# Patient Record
Sex: MALE | Race: WHITE | NOT HISPANIC OR LATINO
[De-identification: names, ages, dates, MRNs, and addresses within clinical notes are randomized per-mention and may not be internally consistent; named-entity substitution may affect disease eponyms.]

---

## 2017-08-22 ENCOUNTER — APPOINTMENT (OUTPATIENT)
Dept: PULMONOLOGY | Facility: CLINIC | Age: 42
End: 2017-08-22
Payer: COMMERCIAL

## 2017-08-22 VITALS
TEMPERATURE: 98 F | WEIGHT: 220 LBS | BODY MASS INDEX: 30.8 KG/M2 | OXYGEN SATURATION: 98 % | HEART RATE: 82 BPM | DIASTOLIC BLOOD PRESSURE: 80 MMHG | SYSTOLIC BLOOD PRESSURE: 120 MMHG | RESPIRATION RATE: 16 BRPM | HEIGHT: 71 IN

## 2017-08-22 PROCEDURE — 99214 OFFICE O/P EST MOD 30 MIN: CPT | Mod: GC

## 2017-08-26 ENCOUNTER — OUTPATIENT (OUTPATIENT)
Dept: OUTPATIENT SERVICES | Facility: HOSPITAL | Age: 42
LOS: 1 days | End: 2017-08-26
Payer: COMMERCIAL

## 2017-08-26 ENCOUNTER — APPOINTMENT (OUTPATIENT)
Dept: CT IMAGING | Facility: IMAGING CENTER | Age: 42
End: 2017-08-26
Payer: COMMERCIAL

## 2017-08-26 DIAGNOSIS — J43.9 EMPHYSEMA, UNSPECIFIED: ICD-10-CM

## 2017-08-26 PROCEDURE — 71250 CT THORAX DX C-: CPT

## 2017-08-26 PROCEDURE — 71250 CT THORAX DX C-: CPT | Mod: 26

## 2017-09-11 ENCOUNTER — APPOINTMENT (OUTPATIENT)
Dept: PULMONOLOGY | Facility: CLINIC | Age: 42
End: 2017-09-11

## 2017-11-20 ENCOUNTER — APPOINTMENT (OUTPATIENT)
Dept: PULMONOLOGY | Facility: CLINIC | Age: 42
End: 2017-11-20

## 2018-05-18 ENCOUNTER — EMERGENCY (EMERGENCY)
Facility: HOSPITAL | Age: 43
LOS: 1 days | Discharge: HOME | End: 2018-05-18
Attending: EMERGENCY MEDICINE | Admitting: EMERGENCY MEDICINE
Payer: COMMERCIAL

## 2018-05-18 VITALS
SYSTOLIC BLOOD PRESSURE: 134 MMHG | RESPIRATION RATE: 16 BRPM | OXYGEN SATURATION: 96 % | HEART RATE: 86 BPM | TEMPERATURE: 98 F | DIASTOLIC BLOOD PRESSURE: 91 MMHG

## 2018-05-18 PROCEDURE — 73562 X-RAY EXAM OF KNEE 3: CPT | Mod: 26,LT

## 2018-05-18 PROCEDURE — 99284 EMERGENCY DEPT VISIT MOD MDM: CPT

## 2018-05-18 PROCEDURE — 71046 X-RAY EXAM CHEST 2 VIEWS: CPT | Mod: 26

## 2018-05-18 RX ORDER — OXYCODONE AND ACETAMINOPHEN 5; 325 MG/1; MG/1
1 TABLET ORAL ONCE
Qty: 0 | Refills: 0 | Status: DISCONTINUED | OUTPATIENT
Start: 2018-05-18 | End: 2018-05-18

## 2018-05-18 RX ORDER — TRAMADOL HYDROCHLORIDE 50 MG/1
1 TABLET ORAL
Qty: 9 | Refills: 0
Start: 2018-05-18 | End: 2018-05-20

## 2018-05-18 RX ADMIN — OXYCODONE AND ACETAMINOPHEN 1 TABLET(S): 5; 325 TABLET ORAL at 18:57
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
## 2018-05-18 NOTE — ED PROVIDER NOTE - EXTREMITY EXAM
moderate posterior thoracic rib pain no step off or crepitus moderate posterior thoracic rib ttp no step off or crepitus

## 2018-05-18 NOTE — ED PROVIDER NOTE - PROGRESS NOTE DETAILS
aixa garcia: no emergent findings on chest xray. pt to follow up with PMD , return precautions given, ready for dc. aixa garcia: no emergent findings on chest xray. pt to follow up with PMD , return precautions given, ready for dc. pt has incentive spirometer encouraged use ED Attending Dr. Winkler: pt requesting stronger pain medication, asking for dilaudid by name previously; reviewed [place]  and he is prescribed Suboxone and alprazolam regularly by Dr. Sukhi Forrester; has previously had 1 Rx for tramadol in the past--will give short supply of tramadol and outpatient follow up

## 2018-05-18 NOTE — ED PROVIDER NOTE - PLAN OF CARE
Rest,  Advance activity as tolerated.  Continue all previously prescribed medications as directed.  Follow up with your primary care physician in 48-72 hours- bring copies of your results.  Return to the Emergency Department for worsening or persistent symptoms OR ANY NEW OR CONCERNING SYMPTOMS. Rest, drink plenty of fluids.  Advance activity as tolerated.  Continue all previously prescribed medications as directed. Take Tramadol as directed- DO NOT drink/drive/operate machinery while on this medication, it can make you drowsy. Use your incentive spirometer as directed.  Follow up with your primary care physician in 48-72 hours- bring copies of your results.  Return to the Emergency Department for worsening or persistent symptoms OR ANY NEW OR CONCERNING SYMPTOMS.

## 2019-01-28 ENCOUNTER — APPOINTMENT (OUTPATIENT)
Dept: PULMONOLOGY | Facility: CLINIC | Age: 44
End: 2019-01-28
Payer: COMMERCIAL

## 2019-01-28 VITALS
RESPIRATION RATE: 16 BRPM | HEART RATE: 68 BPM | DIASTOLIC BLOOD PRESSURE: 79 MMHG | WEIGHT: 238 LBS | SYSTOLIC BLOOD PRESSURE: 132 MMHG | OXYGEN SATURATION: 98 % | BODY MASS INDEX: 33.32 KG/M2 | TEMPERATURE: 97.8 F | HEIGHT: 71 IN

## 2019-01-28 DIAGNOSIS — R04.2 HEMOPTYSIS: ICD-10-CM

## 2019-01-28 PROCEDURE — 99214 OFFICE O/P EST MOD 30 MIN: CPT

## 2019-01-28 NOTE — REASON FOR VISIT
[Follow-Up] : a follow-up visit [COPD] : COPD [Shortness of Breath] : shortness of Breath [Asthma] : asthma [Spouse] : spouse [FreeTextEntry2] : hemoptysis

## 2019-01-28 NOTE — HISTORY OF PRESENT ILLNESS
[FreeTextEntry1] : 42yo male patient former smoker with bullous emphysema, s/p b/l bullectomy in 2015, presenting today with complaint of hemoptysis.  Patient had similar complaint when last seen August 2017.  Patient reports that he had cold-like symptoms three weeks ago, including fits of coughing.  He states that he has had episodes of coughing blood, sometimes bright, red, and brown with clots.  His wife, who is also present, states that the cough is mostly mucous that is blood-tinged.  Today, the episodes seem to have lessened.  Patient does note, however, right sided pleuritic pain.  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  His previous lung functions have revealed an asthma-component, for which he has been maintained on Symbicort and Ventolin.  He has nebulized albuterol, which he uses sparingly.\par \par Chest CT 8/2017 IMPRESSION: \par Extensive emphysema. \par Small nonspecific region of atelectasis of the posterior basilar right lower \par lobe, associated with mild pleural thickening, is increased in prominence. \par Follow-up chest CT can be obtained in 6 months to evaluate for stability of \par this finding.\par

## 2019-01-28 NOTE — PHYSICAL EXAM
[General Appearance - In No Acute Distress] : no acute distress [Normal Conjunctiva] : the conjunctiva exhibited no abnormalities [Normal Oropharynx] : normal oropharynx [Neck Appearance] : the appearance of the neck was normal [Heart Rate And Rhythm] : heart rate and rhythm were normal [Heart Sounds] : normal S1 and S2 [] : no respiratory distress [Respiration, Rhythm And Depth] : normal respiratory rhythm and effort [Auscultation Breath Sounds / Voice Sounds] : lungs were clear to auscultation bilaterally [Abnormal Walk] : normal gait [Nail Clubbing] : no clubbing of the fingernails [No Focal Deficits] : no focal deficits [Affect] : the affect was normal [Cyanosis, Localized] : no localized cyanosis [Mood] : the mood was normal [FreeTextEntry2] : no edema [FreeTextEntry1] : multiple tattoos

## 2019-01-28 NOTE — REVIEW OF SYSTEMS
[Hemoptysis] : hemoptysis [Dyspnea] : dyspnea [Cough] : cough [Negative] : HEENT [Chest Tightness] : no chest tightness [Pleuritic Pain] : no pleuritic pain [Wheezing] : no wheezing [de-identified] : bipolar

## 2019-01-28 NOTE — DISCUSSION/SUMMARY
[FreeTextEntry1] : 42yo male patient former smoker with bullous emphysema, s/p b/l bullectomy in 2015, presenting today with complaint of hemoptysis.  Previous CT notable for bibasilar bullae.  Alpha-1 antitrypsin level to be checked today.  Uncertain as to the cause of his hemoptysis.  Repeat chest CT to be completed to evaluate for progression of disease.  Continue with current inhalers.\par Has reversible obstructive component

## 2019-01-29 ENCOUNTER — OTHER (OUTPATIENT)
Age: 44
End: 2019-01-29

## 2019-01-29 LAB — A1AT SERPL-MCNC: 135 MG/DL

## 2019-02-15 ENCOUNTER — FORM ENCOUNTER (OUTPATIENT)
Age: 44
End: 2019-02-15

## 2019-02-16 ENCOUNTER — OUTPATIENT (OUTPATIENT)
Dept: OUTPATIENT SERVICES | Facility: HOSPITAL | Age: 44
LOS: 1 days | End: 2019-02-16
Payer: COMMERCIAL

## 2019-02-16 ENCOUNTER — APPOINTMENT (OUTPATIENT)
Dept: CT IMAGING | Facility: IMAGING CENTER | Age: 44
End: 2019-02-16
Payer: COMMERCIAL

## 2019-02-16 DIAGNOSIS — Z00.8 ENCOUNTER FOR OTHER GENERAL EXAMINATION: ICD-10-CM

## 2019-02-16 PROCEDURE — 71250 CT THORAX DX C-: CPT

## 2019-02-16 PROCEDURE — 71250 CT THORAX DX C-: CPT | Mod: 26

## 2020-09-30 ENCOUNTER — INPATIENT (INPATIENT)
Facility: HOSPITAL | Age: 45
LOS: 0 days | Discharge: HOME | End: 2020-10-01
Attending: HOSPITALIST | Admitting: HOSPITALIST
Payer: COMMERCIAL

## 2020-09-30 VITALS
OXYGEN SATURATION: 95 % | DIASTOLIC BLOOD PRESSURE: 94 MMHG | RESPIRATION RATE: 20 BRPM | TEMPERATURE: 98 F | HEIGHT: 71 IN | HEART RATE: 77 BPM | SYSTOLIC BLOOD PRESSURE: 122 MMHG

## 2020-09-30 DIAGNOSIS — J44.1 CHRONIC OBSTRUCTIVE PULMONARY DISEASE WITH (ACUTE) EXACERBATION: ICD-10-CM

## 2020-09-30 LAB
ALBUMIN SERPL ELPH-MCNC: 4.4 G/DL — SIGNIFICANT CHANGE UP (ref 3.3–5)
ALP SERPL-CCNC: 64 U/L — SIGNIFICANT CHANGE UP (ref 40–120)
ALT FLD-CCNC: 39 U/L — SIGNIFICANT CHANGE UP (ref 4–41)
ANION GAP SERPL CALC-SCNC: 14 MMO/L — SIGNIFICANT CHANGE UP (ref 7–14)
APTT BLD: 28.5 SEC — SIGNIFICANT CHANGE UP (ref 27–36.3)
AST SERPL-CCNC: 18 U/L — SIGNIFICANT CHANGE UP (ref 4–40)
BASE EXCESS BLDV CALC-SCNC: 7 MMOL/L — SIGNIFICANT CHANGE UP
BASOPHILS # BLD AUTO: 0.11 K/UL — SIGNIFICANT CHANGE UP (ref 0–0.2)
BASOPHILS NFR BLD AUTO: 0.9 % — SIGNIFICANT CHANGE UP (ref 0–2)
BILIRUB SERPL-MCNC: < 0.2 MG/DL — LOW (ref 0.2–1.2)
BLOOD GAS VENOUS - CREATININE: 1.03 MG/DL — SIGNIFICANT CHANGE UP (ref 0.5–1.3)
BLOOD GAS VENOUS - FIO2: 21 — SIGNIFICANT CHANGE UP
BUN SERPL-MCNC: 16 MG/DL — SIGNIFICANT CHANGE UP (ref 7–23)
CALCIUM SERPL-MCNC: 10.1 MG/DL — SIGNIFICANT CHANGE UP (ref 8.4–10.5)
CHLORIDE BLDV-SCNC: 100 MMOL/L — SIGNIFICANT CHANGE UP (ref 96–108)
CHLORIDE SERPL-SCNC: 99 MMOL/L — SIGNIFICANT CHANGE UP (ref 98–107)
CO2 SERPL-SCNC: 29 MMOL/L — SIGNIFICANT CHANGE UP (ref 22–31)
CREAT SERPL-MCNC: 0.88 MG/DL — SIGNIFICANT CHANGE UP (ref 0.5–1.3)
EOSINOPHIL # BLD AUTO: 0.12 K/UL — SIGNIFICANT CHANGE UP (ref 0–0.5)
EOSINOPHIL NFR BLD AUTO: 1 % — SIGNIFICANT CHANGE UP (ref 0–6)
GAS PNL BLDV: 143 MMOL/L — SIGNIFICANT CHANGE UP (ref 136–146)
GLUCOSE BLDV-MCNC: 101 MG/DL — HIGH (ref 70–99)
GLUCOSE SERPL-MCNC: 100 MG/DL — HIGH (ref 70–99)
HCO3 BLDV-SCNC: 28 MMOL/L — HIGH (ref 20–27)
HCT VFR BLD CALC: 46.1 % — SIGNIFICANT CHANGE UP (ref 39–50)
HCT VFR BLDV CALC: 50.2 % — SIGNIFICANT CHANGE UP (ref 39–51)
HGB BLD-MCNC: 15.6 G/DL — SIGNIFICANT CHANGE UP (ref 13–17)
HGB BLDV-MCNC: 16.4 G/DL — SIGNIFICANT CHANGE UP (ref 13–17)
IMM GRANULOCYTES NFR BLD AUTO: 1.8 % — HIGH (ref 0–1.5)
INR BLD: 0.95 — SIGNIFICANT CHANGE UP (ref 0.88–1.16)
LACTATE BLDV-MCNC: 1.4 MMOL/L — SIGNIFICANT CHANGE UP (ref 0.5–2)
LYMPHOCYTES # BLD AUTO: 27.2 % — SIGNIFICANT CHANGE UP (ref 13–44)
LYMPHOCYTES # BLD AUTO: 3.24 K/UL — SIGNIFICANT CHANGE UP (ref 1–3.3)
MCHC RBC-ENTMCNC: 31 PG — SIGNIFICANT CHANGE UP (ref 27–34)
MCHC RBC-ENTMCNC: 33.8 % — SIGNIFICANT CHANGE UP (ref 32–36)
MCV RBC AUTO: 91.7 FL — SIGNIFICANT CHANGE UP (ref 80–100)
MONOCYTES # BLD AUTO: 0.96 K/UL — HIGH (ref 0–0.9)
MONOCYTES NFR BLD AUTO: 8.1 % — SIGNIFICANT CHANGE UP (ref 2–14)
NEUTROPHILS # BLD AUTO: 7.26 K/UL — SIGNIFICANT CHANGE UP (ref 1.8–7.4)
NEUTROPHILS NFR BLD AUTO: 61 % — SIGNIFICANT CHANGE UP (ref 43–77)
NRBC # FLD: 0 K/UL — SIGNIFICANT CHANGE UP (ref 0–0)
NT-PROBNP SERPL-SCNC: 16.95 PG/ML — SIGNIFICANT CHANGE UP
PCO2 BLDV: 56 MMHG — HIGH (ref 41–51)
PH BLDV: 7.38 PH — SIGNIFICANT CHANGE UP (ref 7.32–7.43)
PLATELET # BLD AUTO: 217 K/UL — SIGNIFICANT CHANGE UP (ref 150–400)
PMV BLD: 9.9 FL — SIGNIFICANT CHANGE UP (ref 7–13)
PO2 BLDV: 35 MMHG — SIGNIFICANT CHANGE UP (ref 35–40)
POTASSIUM BLDV-SCNC: 4.2 MMOL/L — SIGNIFICANT CHANGE UP (ref 3.4–4.5)
POTASSIUM SERPL-MCNC: 4.4 MMOL/L — SIGNIFICANT CHANGE UP (ref 3.5–5.3)
POTASSIUM SERPL-SCNC: 4.4 MMOL/L — SIGNIFICANT CHANGE UP (ref 3.5–5.3)
PROT SERPL-MCNC: 7 G/DL — SIGNIFICANT CHANGE UP (ref 6–8.3)
PROTHROM AB SERPL-ACNC: 10.9 SEC — SIGNIFICANT CHANGE UP (ref 10.6–13.6)
RBC # BLD: 5.03 M/UL — SIGNIFICANT CHANGE UP (ref 4.2–5.8)
RBC # FLD: 12.7 % — SIGNIFICANT CHANGE UP (ref 10.3–14.5)
SAO2 % BLDV: 63.9 % — SIGNIFICANT CHANGE UP (ref 60–85)
SODIUM SERPL-SCNC: 142 MMOL/L — SIGNIFICANT CHANGE UP (ref 135–145)
TROPONIN T, HIGH SENSITIVITY: 6 NG/L — SIGNIFICANT CHANGE UP (ref ?–14)
TROPONIN T, HIGH SENSITIVITY: 7 NG/L — SIGNIFICANT CHANGE UP (ref ?–14)
WBC # BLD: 11.91 K/UL — HIGH (ref 3.8–10.5)
WBC # FLD AUTO: 11.91 K/UL — HIGH (ref 3.8–10.5)

## 2020-09-30 PROCEDURE — 99285 EMERGENCY DEPT VISIT HI MDM: CPT

## 2020-09-30 PROCEDURE — 71275 CT ANGIOGRAPHY CHEST: CPT | Mod: 26

## 2020-09-30 RX ORDER — ALBUTEROL 90 UG/1
8 AEROSOL, METERED ORAL ONCE
Refills: 0 | Status: COMPLETED | OUTPATIENT
Start: 2020-09-30 | End: 2020-09-30

## 2020-09-30 RX ORDER — ASPIRIN/CALCIUM CARB/MAGNESIUM 324 MG
81 TABLET ORAL DAILY
Refills: 0 | Status: DISCONTINUED | OUTPATIENT
Start: 2020-09-30 | End: 2020-10-01

## 2020-09-30 RX ADMIN — Medication 81 MILLIGRAM(S): at 21:23

## 2020-09-30 RX ADMIN — Medication 125 MILLIGRAM(S): at 19:01

## 2020-09-30 RX ADMIN — ALBUTEROL 8 PUFF(S): 90 AEROSOL, METERED ORAL at 18:55

## 2020-09-30 NOTE — ED PROVIDER NOTE - CARE PLAN
Principal Discharge DX:	COPD exacerbation   Principal Discharge DX:	COPD exacerbation  Secondary Diagnosis:	Chest pain in adult

## 2020-09-30 NOTE — ED PROVIDER NOTE - CLINICAL SUMMARY MEDICAL DECISION MAKING FREE TEXT BOX
pt with cp and sob, h/o ptx  cbc, cmp, trop, coags, ekg, cta chest; reassess 41 y/o M with PMH of COPD from asbestos exposure w/ pneumothorax in past, psych disorder p/w 1 week of chest pain  and sob. Pt tried home albuterol , azithro and prednisone but symptoms persisted. + chest pain, + SOB. concern for possible partially treated COpd exacerbation vs PE vs CAD will obtain cbc ,cmp, vbg, pt/inr, ct angio chest, trop, probnp, albuterol, steroids, levaquin, admit

## 2020-09-30 NOTE — ED PROVIDER NOTE - PROGRESS NOTE DETAILS
Dr. Kirby: I have personally seen and examined this patient at the bedside. I have fully participated in the care of this patient. I have reviewed all pertinent clinical information, including history, physical exam, plan and the PA's note and agree except as noted. HPI above as by me. PE above as by me. DDX PLAN discussed with hospitalist patient was admitted to medicine for further evaluation and treatment/management will give asa

## 2020-09-30 NOTE — ED PROVIDER NOTE - ATTENDING CONTRIBUTION TO CARE
41 y/o M with PMH of COPD from asbestos exposure w/ pneumothorax in past, psych disorder p/w 1 week of chest pain . Pain is pleuritic located on the left side of his chest similar to when he had a ptx. He Denies cough, fever, nausea, vomting, diarrhea. LE edema. Pt states the pain is 5/10 worse with inspiration. He denies sudden onset , states it was sudden onset . He took some steroids and azithromycin which did not fully resolve his symptoms. He states he still has pain with deep inspiration and with exertion. He denies fever, chills. abdominal pain. He went to urgent care and had a xray and was told he may need a ct. Ddenies LE swelling. Pulm Dr. handley  denies fever, chills, +chest pain, +SOB, abdominal pain, diarrhea, dysuria, syncope, bleeding, new rash,weakness, numbness, blurred vision    ROS  otherwise negative as per HPI  Gen: Awake, Alert, WD, WN, NAD  Head:  NC/AT  Eyes:  PERRL, EOMI, Conjunctiva pink, lids normal, no scleral icterus  ENT: moist mucus membranes  Neck: supple, nontender, no meningismus, no JVD, trachea midline  Cardiac/CV:  S1 S2, tachycardic, no M/G/R  Respiratory/Pulm:  prolonged respiratory phase, diminished in bases   Gastrointestinal/Abdomen:  Soft, nontender, nondistended, +BS, no rebound/guarding  Back:  no CVAT, no MLT  Ext:  warm, well perfused, moving all extremities spontaneously, no peripheral edema, distal pulses intact  Skin: intact, no rash  Neuro:  AAOx3, sensation intact, motor 5/5 x 4 extremities, normal gait, speech clear  MDM as above

## 2020-09-30 NOTE — ED ADULT NURSE NOTE - OBJECTIVE STATEMENT
pt received spot 8. pt A+Ox3 , hx of COPD and seizures, c/o back pain, describes as  "sharp lung pain" and sob since wednesday. states he started himself on oral steroids and antibiotics with no improvement. denies cp/fever/chills. respirations even and unlabored. labs sent. IVSL in place. placed on > MD mason on going. awaiting further orders. will monitor.

## 2020-09-30 NOTE — ED PROVIDER NOTE - OBJECTIVE STATEMENT
44 y/o M PMH COPD with h/o bullectomy 2/2 asbestos exposure c/b spontaneous pneumothorax c/o lt sided CP and SOB x 1 week. Pain is worse with deep insirpation, not effected by movement or exertion. Pt went to urgent care last week and had cxr which was inconclusive noting an abnormality to LLL and recommending CT for further eval. Denies fever, chills, cough, congestion, abdominal pain, N/V, recent trauma.

## 2020-09-30 NOTE — ED ADULT NURSE REASSESSMENT NOTE - NS ED NURSE REASSESS COMMENT FT1
Received report from STEFAN Xiao. Patient A&OX4. Patient vital signs as noted. patient in no acute distress. Patient at 95% room air. Patient to be admitted at this time. Will continue to monitor.

## 2020-09-30 NOTE — ED ADULT TRIAGE NOTE - CHIEF COMPLAINT QUOTE
Patient c/o chest pain, lung discomfort feels like "lungs are being punched", pressure on chest.  History COPD, pneumothorax in 2015. Taking steroids/inhaler since Wednesday without improvement.  Had outpatient XR and was sent to ED for abnormal findings (has copy report).  Respirations nonlabored, O2sat 95% in triage, audible wheezing. Denies fevers/chills/cough.

## 2020-10-01 ENCOUNTER — TRANSCRIPTION ENCOUNTER (OUTPATIENT)
Age: 45
End: 2020-10-01

## 2020-10-01 VITALS
HEART RATE: 83 BPM | RESPIRATION RATE: 18 BRPM | DIASTOLIC BLOOD PRESSURE: 76 MMHG | SYSTOLIC BLOOD PRESSURE: 134 MMHG | OXYGEN SATURATION: 94 % | TEMPERATURE: 98 F

## 2020-10-01 DIAGNOSIS — R07.9 CHEST PAIN, UNSPECIFIED: ICD-10-CM

## 2020-10-01 DIAGNOSIS — Z98.890 OTHER SPECIFIED POSTPROCEDURAL STATES: Chronic | ICD-10-CM

## 2020-10-01 DIAGNOSIS — F31.9 BIPOLAR DISORDER, UNSPECIFIED: ICD-10-CM

## 2020-10-01 DIAGNOSIS — J44.1 CHRONIC OBSTRUCTIVE PULMONARY DISEASE WITH (ACUTE) EXACERBATION: ICD-10-CM

## 2020-10-01 DIAGNOSIS — Z02.9 ENCOUNTER FOR ADMINISTRATIVE EXAMINATIONS, UNSPECIFIED: ICD-10-CM

## 2020-10-01 DIAGNOSIS — Z29.9 ENCOUNTER FOR PROPHYLACTIC MEASURES, UNSPECIFIED: ICD-10-CM

## 2020-10-01 DIAGNOSIS — R93.89 ABNORMAL FINDINGS ON DIAGNOSTIC IMAGING OF OTHER SPECIFIED BODY STRUCTURES: ICD-10-CM

## 2020-10-01 DIAGNOSIS — J44.9 CHRONIC OBSTRUCTIVE PULMONARY DISEASE, UNSPECIFIED: ICD-10-CM

## 2020-10-01 DIAGNOSIS — R73.03 PREDIABETES: ICD-10-CM

## 2020-10-01 LAB
ANION GAP SERPL CALC-SCNC: 18 MMO/L — HIGH (ref 7–14)
BUN SERPL-MCNC: 16 MG/DL — SIGNIFICANT CHANGE UP (ref 7–23)
CALCIUM SERPL-MCNC: 9.9 MG/DL — SIGNIFICANT CHANGE UP (ref 8.4–10.5)
CHLORIDE SERPL-SCNC: 95 MMOL/L — LOW (ref 98–107)
CHOLEST SERPL-MCNC: 202 MG/DL — HIGH (ref 120–199)
CO2 SERPL-SCNC: 26 MMOL/L — SIGNIFICANT CHANGE UP (ref 22–31)
CREAT SERPL-MCNC: 0.68 MG/DL — SIGNIFICANT CHANGE UP (ref 0.5–1.3)
GLUCOSE SERPL-MCNC: 191 MG/DL — HIGH (ref 70–99)
HBA1C BLD-MCNC: 6.2 % — HIGH (ref 4–5.6)
HCT VFR BLD CALC: 45.3 % — SIGNIFICANT CHANGE UP (ref 39–50)
HDLC SERPL-MCNC: 82 MG/DL — HIGH (ref 35–55)
HGB BLD-MCNC: 15.2 G/DL — SIGNIFICANT CHANGE UP (ref 13–17)
LIPID PNL WITH DIRECT LDL SERPL: 114 MG/DL — SIGNIFICANT CHANGE UP
MAGNESIUM SERPL-MCNC: 1.8 MG/DL — SIGNIFICANT CHANGE UP (ref 1.6–2.6)
MCHC RBC-ENTMCNC: 30.4 PG — SIGNIFICANT CHANGE UP (ref 27–34)
MCHC RBC-ENTMCNC: 33.6 % — SIGNIFICANT CHANGE UP (ref 32–36)
MCV RBC AUTO: 90.6 FL — SIGNIFICANT CHANGE UP (ref 80–100)
NRBC # FLD: 0 K/UL — SIGNIFICANT CHANGE UP (ref 0–0)
PHOSPHATE SERPL-MCNC: 4 MG/DL — SIGNIFICANT CHANGE UP (ref 2.5–4.5)
PLATELET # BLD AUTO: 217 K/UL — SIGNIFICANT CHANGE UP (ref 150–400)
PMV BLD: 10 FL — SIGNIFICANT CHANGE UP (ref 7–13)
POTASSIUM SERPL-MCNC: 4.3 MMOL/L — SIGNIFICANT CHANGE UP (ref 3.5–5.3)
POTASSIUM SERPL-SCNC: 4.3 MMOL/L — SIGNIFICANT CHANGE UP (ref 3.5–5.3)
RBC # BLD: 5 M/UL — SIGNIFICANT CHANGE UP (ref 4.2–5.8)
RBC # FLD: 12.5 % — SIGNIFICANT CHANGE UP (ref 10.3–14.5)
SARS-COV-2 RNA SPEC QL NAA+PROBE: SIGNIFICANT CHANGE UP
SODIUM SERPL-SCNC: 139 MMOL/L — SIGNIFICANT CHANGE UP (ref 135–145)
TRIGL SERPL-MCNC: 74 MG/DL — SIGNIFICANT CHANGE UP (ref 10–149)
TSH SERPL-MCNC: 0.77 UIU/ML — SIGNIFICANT CHANGE UP (ref 0.27–4.2)
WBC # BLD: 10.09 K/UL — SIGNIFICANT CHANGE UP (ref 3.8–10.5)
WBC # FLD AUTO: 10.09 K/UL — SIGNIFICANT CHANGE UP (ref 3.8–10.5)

## 2020-10-01 PROCEDURE — 99239 HOSP IP/OBS DSCHRG MGMT >30: CPT

## 2020-10-01 PROCEDURE — 99223 1ST HOSP IP/OBS HIGH 75: CPT

## 2020-10-01 RX ORDER — BUDESONIDE AND FORMOTEROL FUMARATE DIHYDRATE 160; 4.5 UG/1; UG/1
2 AEROSOL RESPIRATORY (INHALATION)
Refills: 0 | Status: DISCONTINUED | OUTPATIENT
Start: 2020-10-01 | End: 2020-10-01

## 2020-10-01 RX ORDER — IPRATROPIUM/ALBUTEROL SULFATE 18-103MCG
3 AEROSOL WITH ADAPTER (GRAM) INHALATION EVERY 6 HOURS
Refills: 0 | Status: DISCONTINUED | OUTPATIENT
Start: 2020-10-01 | End: 2020-10-01

## 2020-10-01 RX ORDER — FLUOXETINE HCL 10 MG
20 CAPSULE ORAL DAILY
Refills: 0 | Status: DISCONTINUED | OUTPATIENT
Start: 2020-10-01 | End: 2020-10-01

## 2020-10-01 RX ORDER — ACETAMINOPHEN 500 MG
650 TABLET ORAL EVERY 6 HOURS
Refills: 0 | Status: DISCONTINUED | OUTPATIENT
Start: 2020-10-01 | End: 2020-10-01

## 2020-10-01 RX ORDER — ASPIRIN/CALCIUM CARB/MAGNESIUM 324 MG
1 TABLET ORAL
Qty: 30 | Refills: 0
Start: 2020-10-01 | End: 2020-10-30

## 2020-10-01 RX ORDER — ENOXAPARIN SODIUM 100 MG/ML
40 INJECTION SUBCUTANEOUS DAILY
Refills: 0 | Status: DISCONTINUED | OUTPATIENT
Start: 2020-10-01 | End: 2020-10-01

## 2020-10-01 RX ORDER — DIVALPROEX SODIUM 500 MG/1
0 TABLET, DELAYED RELEASE ORAL
Qty: 0 | Refills: 0 | DISCHARGE

## 2020-10-01 RX ORDER — DIVALPROEX SODIUM 500 MG/1
1 TABLET, DELAYED RELEASE ORAL
Qty: 0 | Refills: 0 | DISCHARGE

## 2020-10-01 RX ORDER — DIVALPROEX SODIUM 500 MG/1
500 TABLET, DELAYED RELEASE ORAL
Refills: 0 | Status: DISCONTINUED | OUTPATIENT
Start: 2020-10-01 | End: 2020-10-01

## 2020-10-01 RX ORDER — SODIUM CHLORIDE 9 MG/ML
3 INJECTION INTRAMUSCULAR; INTRAVENOUS; SUBCUTANEOUS EVERY 8 HOURS
Refills: 0 | Status: DISCONTINUED | OUTPATIENT
Start: 2020-10-01 | End: 2020-10-01

## 2020-10-01 RX ORDER — MONTELUKAST 4 MG/1
10 TABLET, CHEWABLE ORAL DAILY
Refills: 0 | Status: DISCONTINUED | OUTPATIENT
Start: 2020-10-01 | End: 2020-10-01

## 2020-10-01 RX ADMIN — Medication 81 MILLIGRAM(S): at 11:37

## 2020-10-01 RX ADMIN — Medication 40 MILLIGRAM(S): at 06:20

## 2020-10-01 RX ADMIN — SODIUM CHLORIDE 3 MILLILITER(S): 9 INJECTION INTRAMUSCULAR; INTRAVENOUS; SUBCUTANEOUS at 06:21

## 2020-10-01 RX ADMIN — Medication 3 MILLILITER(S): at 04:20

## 2020-10-01 RX ADMIN — Medication 3 MILLILITER(S): at 09:25

## 2020-10-01 RX ADMIN — Medication 650 MILLIGRAM(S): at 03:11

## 2020-10-01 RX ADMIN — DIVALPROEX SODIUM 500 MILLIGRAM(S): 500 TABLET, DELAYED RELEASE ORAL at 06:17

## 2020-10-01 RX ADMIN — ENOXAPARIN SODIUM 40 MILLIGRAM(S): 100 INJECTION SUBCUTANEOUS at 11:37

## 2020-10-01 RX ADMIN — MONTELUKAST 10 MILLIGRAM(S): 4 TABLET, CHEWABLE ORAL at 11:37

## 2020-10-01 RX ADMIN — Medication 650 MILLIGRAM(S): at 03:45

## 2020-10-01 NOTE — H&P ADULT - PROBLEM SELECTOR PLAN 5
1.  Name of PCP: Angel Katz   2.  PCP Contacted on Admission: [ ] Y    [X] N    3.  PCP contacted at Discharge: [ ] Y    [ ] N    [ ] N/A  4.  Post-Discharge Appointment Date and Location:  5.  Summary of Handoff given to PCP: VTE with Lovenox 40 mg Sub cut daily.  Fall, Aspiration, safety precautions.

## 2020-10-01 NOTE — H&P ADULT - GASTROINTESTINAL DETAILS
no bruit/no guarding/no rigidity/no masses palpable/no rebound tenderness/soft/nontender/bowel sounds normal

## 2020-10-01 NOTE — PROGRESS NOTE ADULT - PROBLEM SELECTOR PLAN 6
VTE with Lovenox 40 mg Sub cut daily.  Fall, Aspiration, safety precautions. VTE with Lovenox 40 mg Sub cut daily.  Fall, Aspiration, safety precautions.  Dispo - DC Planning 31mins  FU PMD/Pulmonology as outpt

## 2020-10-01 NOTE — DISCHARGE NOTE PROVIDER - NSDCCPCAREPLAN_GEN_ALL_CORE_FT
PRINCIPAL DISCHARGE DIAGNOSIS  Diagnosis: COPD exacerbation  Assessment and Plan of Treatment: To slow down the progression of the disease by quitting smoking and avoiding triggers, such as air pollution.  Continue current medications as prescribed to prevent as shortness of breath, COPD exacerbation and to improve your overall health with regular activity and quality of life.  Follow up routine appointment, with Dr. Claire.  Call for an appointment.

## 2020-10-01 NOTE — DISCHARGE NOTE PROVIDER - NSDCMRMEDTOKEN_GEN_ALL_CORE_FT
aspirin 81 mg oral tablet, chewable: 1 tab(s) orally once a day  divalproex sodium 500 mg oral delayed release tablet: 1 tab(s) orally 2 times a day  FLUoxetine 20 mg oral capsule: 1 cap(s) orally once a day  IPRAT-ALBUT 0.5-3(2.5) MG/3 ML:   levoFLOXacin 500 mg oral tablet: 1 tab(s) orally every 24 hours  predniSONE 20 mg oral tablet: 2 tab(s) orally once a day  Singulair 10 mg oral tablet: 1 tab(s) orally once a day  Symbicort 160 mcg-4.5 mcg/inh inhalation aerosol: 2 puff(s) inhaled 2 times a day

## 2020-10-01 NOTE — DISCHARGE NOTE PROVIDER - PROVIDER TOKENS
PROVIDER:[TOKEN:[[id number]],ESTABLISHEDPATIENT:[T]] PROVIDER:[TOKEN:[[id number]],FOLLOWUP:[1 week],ESTABLISHEDPATIENT:[T]]

## 2020-10-01 NOTE — PROGRESS NOTE ADULT - PROBLEM SELECTOR PLAN 3
Atypical chest pain in nature likely secondary to COPD exacerbation   low suspicion for ACS  (Trop 7-->6, EKG nonischemic)   CTA read without PE   -continue to monitor CP and manage COPD, currently improved

## 2020-10-01 NOTE — H&P ADULT - NEGATIVE OPHTHALMOLOGIC SYMPTOMS
no lacrimation R/no discharge R/no blurred vision R/no blurred vision L/no discharge L/no photophobia/no pain L/no lacrimation L

## 2020-10-01 NOTE — H&P ADULT - NSHPLABSRESULTS_GEN_ALL_CORE
CTPA: No pulmonary embolus. No pneumothorax. Severe bolus disease and emphysema within the upper lungs, right greater than left.  EKG NSR @ 73b/ min , QT/ QTC= 384/ 423.  Trop 7> 6  ProBNP= 17                        15.6   11.91 )-----------( 217      ( 30 Sep 2020 18:35 )             46.1   09-30    142  |  99  |  16  ----------------------------<  100<H>  4.4   |  29  |  0.88    Ca    10.1      30 Sep 2020 18:35    TPro  7.0  /  Alb  4.4  /  TBili  < 0.2<L>  /  DBili  x   /  AST  18  /  ALT  39  /  AlkPhos  64  09-30 CTPA: No pulmonary embolus. No pneumothorax. Severe bolus disease and emphysema within the upper lungs, right greater than left.  EKG NSR @ 73b/ min , QT/ QTC= 384/ 423.  Trop 7> 6  ProBNP= 17  COVID PCR : Not detected.                         15.6   11.91 )-----------( 217      ( 30 Sep 2020 18:35 )             46.1   09-30    142  |  99  |  16  ----------------------------<  100<H>  4.4   |  29  |  0.88    Ca    10.1      30 Sep 2020 18:35    TPro  7.0  /  Alb  4.4  /  TBili  < 0.2<L>  /  DBili  x   /  AST  18  /  ALT  39  /  AlkPhos  64  09-30 I have personally reviewed this patient's labs below:                          15.6   11.91 )-----------( 217      ( 30 Sep 2020 18:35 )             46.1     09-30-20 @ 18:35    142  |  99  |  16             --------------------------< 100<H>     4.4  |  29  | 0.88    eGFR AA: 119  eGFR N-AA: 102    Calcium: 10.1  Phosphorus: --  Magnesium: --    AST: 18    ALT: 39  AlkPhos: 64  Protein: 7.0  Albumin: 4.4  TBili: < 0.2<L>  D-Bili: --    VBG - ( 30 Sep 2020 18:35 )  pH: 7.38  /  pCO2: 56    /  pO2: 35    / HCO3: 28    / Base Excess: 7.0   /  SvO2: 63.9  / Lactate: 1.4        Troponin 7--6    COVID negative    Probnp 16.95    I have personally reviewed this patient's EKG and my independent interpretation is NSR, no ischemic changes    I have personally reviewed this patient's CTA chest and my independent interpretation is extensive bullous changes bilaterally, right apex worse than left, no pleural effusions or consolidations. No central PE      CTA chest  IMPRESSION:    No pulmonary embolus. No pneumothorax.    Severe bolus disease and emphysema within the upper lungs, right greater than left

## 2020-10-01 NOTE — H&P ADULT - PROBLEM SELECTOR PLAN 6
1.  Name of PCP: Angel Katz   2.  PCP Contacted on Admission: [ ] Y    [X] N    3.  PCP contacted at Discharge: [ ] Y    [ ] N    [ ] N/A  4.  Post-Discharge Appointment Date and Location:  5.  Summary of Handoff given to PCP:

## 2020-10-01 NOTE — H&P ADULT - PROBLEM SELECTOR PROBLEM 4
Need for prophylactic measure Chronic obstructive pulmonary disease, unspecified COPD type Bipolar affective disorder, remission status unspecified

## 2020-10-01 NOTE — H&P ADULT - HISTORY OF PRESENT ILLNESS
45M with history of COPD, asbestos exposure, spontaneous pneumothorax, secondary to ruptured bullae s/p bilateral bullectomy and chest tubes, experiencing SOB, CONDE after a few feet, chills, generalized body aches, diaphoresis and HA, that began on 9/22. On 9/26, the pt was still with symptoms and began taking his wife's Zithromax 500mg po and Prednisone 40 mg po daily. His symptoms began to improve.  He then had a flu vaccine on 9/29 and the symptoms began to return. He went to [facility] and had a CXR done that showed: "Nodular density projecting over the L posterior 9th rib may be within the lung or the bone.  Recommend direct comparison with any prior outside studies to demonstrate stability of these findings. if these are not available CT of the thorax is suggested for further evaluation." The pt came to the Ed.    In the Ed, the pt was treated with Solumedrol 125 mg IV X 1 , Albuterol Inhaler 8 puffs and Levaquin 500mg IV., The pt says it provided relief of his symptoms.   CTPA: No pulmonary embolus. No pneumothorax. Severe bolus disease and emphysema within the upper lungs, right greater than left.  EKG NSR @ 73b/ min , QT/ QTC= 384/ 423.  Trop: 7>6.     Vitals T max 98.1, HR = 60b/ min, BP = 141/ 72, RR= 18b/ min, SPO2= 95% 2LNC

## 2020-10-01 NOTE — H&P ADULT - MUSCULOSKELETAL
details… detailed exam no calf tenderness/no joint warmth/normal strength/no joint erythema no joint warmth/no calf tenderness/ROM intact/normal strength/no joint swelling/no joint erythema

## 2020-10-01 NOTE — H&P ADULT - PROBLEM SELECTOR PROBLEM 3
Chronic obstructive pulmonary disease, unspecified COPD type Abnormal chest xray Chest pain, unspecified type

## 2020-10-01 NOTE — DISCHARGE NOTE PROVIDER - HOSPITAL COURSE
45M with history of COPD, asbestos exposure, spontaneous pneumothorax, secondary to ruptured bullae s/p bilateral bullectomy and chest tubes, experiencing SOB, CONDE after a few feet, chills, generalized body aches, diaphoresis and HA, that began on 9/22. On 9/26, the pt was still with symptoms and began taking his wife's Zithromax 500mg po and Prednisone 40 mg po daily. His symptoms began to improve.  He then had a flu vaccine on 9/29 and the symptoms began to return. He went to [facility] and had a CXR done that showed: "Nodular density projecting over the L posterior 9th rib may be within the lung or the bone.  Recommend direct comparison with any prior outside studies to demonstrate stability of these findings. if these are not available CT of the thorax is suggested for further evaluation." The pt came to the Ed.    In the Ed, the pt was treated with Solumedrol 125 mg IV X 1 , Albuterol Inhaler 8 puffs and Levaquin 500mg IV., The pt says it provided relief of his symptoms.   CTPA: No pulmonary embolus. No pneumothorax. Severe bolus disease and emphysema within the upper lungs, right greater than left.  EKG NSR @ 73b/ min , QT/ QTC= 384/ 423.  He was admitted to the floor and switched to PO Levaquin and Prednisone.  Patient had improvement of symptoms and is now clear for discharge home.

## 2020-10-01 NOTE — H&P ADULT - NEGATIVE NEUROLOGICAL SYMPTOMS
no weakness/no generalized seizures/no tremors/no facial palsy/no focal seizures/no syncope/no difficulty walking/no loss of consciousness/no transient paralysis/no paresthesias/no hemiparesis/no vertigo/no loss of sensation/no confusion

## 2020-10-01 NOTE — H&P ADULT - NSHPSOCIALHISTORY_GEN_ALL_CORE
.  Lives with the spouse.  Quit Nicotine 2014 after 12 pack years  Denies ETOH.  Denies Illicit / recreational drug use.   Works with elevators.  Colonoscopy: Denies.

## 2020-10-01 NOTE — PROGRESS NOTE ADULT - PROBLEM SELECTOR PLAN 1
Admitted with  SOB upon presentation to ED,   Now much improved after steroids, nebs and abx.   Placed on 2L NC  in ED, documented O2 sats  95% on RA  continue PO levaquin 500mg qd, prednisone 40mg qd, duonebs PRN  Continue home symbicort and Singulair.  Wean O2 as tolerated Admitted with  SOB upon presentation to ED,   Now much improved after steroids, nebs and abx.   Placed on 2L NC  in ED, documented O2 sats  95% on RA  O2 sats on RA- 98% at rest and 94-96% on ambulation, improving to 98%   continue PO levaquin 500mg qd, prednisone 40mg qd, duonebs PRN  Continue home symbicort and Singulair.

## 2020-10-01 NOTE — H&P ADULT - PROBLEM SELECTOR PLAN 2
Cardiac monitor.  Trop 7>6.  Give O2, ASA.  F/U Lipid panel, TSH, A1C. Cardiac monitor.  EKG without signs of ischemia.   Trop 7>6.  Give O2 via NC PRN, ASA.  F/U Lipid panel, TSH, A1C. Out pt CXR  9/29 revealing "Nodular density projecting over the L posterior 9th rib may be within the lung or the bone.  -2/16/19: CT Chest:  No suspicious pulmonary nodule. Emphysema. No aggressive osseous lesion.   -9/30/20: CTPA: No pulmonary embolus. No pneumothorax. Severe bolus disease and emphysema within the upper lungs, right greater than left. No aggressive osseous lesion.

## 2020-10-01 NOTE — H&P ADULT - PROBLEM SELECTOR PLAN 3
Continue Inhalers and Singulair. Done out pt 9/29 revealing "Nodular density projecting over the L posterior 9th rib may be within the lung or the bone.    -2/16/19: CT Chest:  No suspicious pulmonary nodule. Emphysema. No aggressive osseous lesion.   -9/30/20: CTPA: No pulmonary embolus. No pneumothorax. Severe bolus disease and emphysema within the upper lungs, right greater than left. No aggressive osseous lesion. Out pt CXR  9/29 revealing "Nodular density projecting over the L posterior 9th rib may be within the lung or the bone.  -2/16/19: CT Chest:  No suspicious pulmonary nodule. Emphysema. No aggressive osseous lesion.   -9/30/20: CTPA: No pulmonary embolus. No pneumothorax. Severe bolus disease and emphysema within the upper lungs, right greater than left. No aggressive osseous lesion. Atypical chest pain in nature, Trop 7-->6, EKG nonischemic. ACS ruled out. CTA read without PE. Chest pain likely related to COPD/bulla  -continue to monitor CP and manage COPD, currently improved

## 2020-10-01 NOTE — H&P ADULT - ASSESSMENT
45M with history of COPD, asbestos exposure, spontaneous pneumothorax admitted for COPD exacerbation.

## 2020-10-01 NOTE — PROGRESS NOTE ADULT - PROBLEM SELECTOR PLAN 2
Out pt CXR  9/29 revealing "Nodular density projecting over the L posterior 9th rib may be within the lung or the bone.  2/16/19: CT Chest:  No suspicious pulmonary nodule. Emphysema. No aggressive osseous lesion.   9/30/20: CTPA: No pulmonary embolus. No pneumothorax. Severe bullous disease and emphysema within the upper lungs, right greater than left. No aggressive osseous lesion. Out pt CXR  9/29 revealing "Nodular density projecting over the L posterior 9th rib may be within the lung or the bone.  2/16/19: CT Chest:  No suspicious pulmonary nodule. Emphysema. No aggressive osseous lesion.   9/30/20: CTPA: No pulmonary embolus. No pneumothorax. Severe bullous disease and emphysema within the upper lungs, right greater than left. No aggressive osseous lesion.  pt follows up with Dr Claire as outpt, Pt has an appointment on 10/13  with Dr Claire

## 2020-10-01 NOTE — DISCHARGE NOTE NURSING/CASE MANAGEMENT/SOCIAL WORK - PATIENT PORTAL LINK FT
You can access the FollowMyHealth Patient Portal offered by [facility] by registering at the following website: [email or web address]. By joining [employer]’s FollowMyHealth portal, you will also be able to view your health information using other applications (apps) compatible with our system.

## 2020-10-01 NOTE — H&P ADULT - PROBLEM SELECTOR PLAN 4
VTE with Lovenox 40 mg Sub cut daily.  Fall, Aspiration, safety precautions. Continue Inhalers and Singulair. Continue depakote

## 2020-10-01 NOTE — H&P ADULT - RS GEN PE MLT RESP DETAILS PC
no intercostal retractions/no rhonchi/clear to auscultation bilaterally/no rales/no wheezes/no chest wall tenderness/good air movement/no subcutaneous emphysema/airway patent/breath sounds equal/respirations non-labored

## 2020-10-01 NOTE — H&P ADULT - PROBLEM SELECTOR PLAN 1
Continue Levaquin 500mg IV, Prednisone 40 mg po daily, Duo Nebs Q6* PRN.  Incentive spirometry. Continue Levaquin 500mg IV, Prednisone 40 mg po daily, Duo Nebs Q6* PRN.  Incentive spirometry.  Continue Inhalers and Singulair. Initially with SOB upon presentation to ED, now much improved after steroids, nebs and abx. No change in cough, no wheezing on exam at this time. Placed on 2L NC for desat in ED.   -continue PO levaquin 500mg qd, prednisone 40mg qd, duonebs PRN  -continue home symbicort and Singulair.

## 2020-10-01 NOTE — PROGRESS NOTE ADULT - SUBJECTIVE AND OBJECTIVE BOX
PROGRESS NOTE:     Patient is a 45y old  Male who presents with a chief complaint of SOB X 1 week (01 Oct 2020 04:16)      SUBJECTIVE / OVERNIGHT EVENTS:    ADDITIONAL REVIEW OF SYSTEMS:    MEDICATIONS  (STANDING):  aspirin  chewable 81 milliGRAM(s) Oral daily  budesonide 160 MICROgram(s)/formoterol 4.5 MICROgram(s) Inhaler 2 Puff(s) Inhalation two times a day  diVALproex  milliGRAM(s) Oral two times a day  enoxaparin Injectable 40 milliGRAM(s) SubCutaneous daily  levoFLOXacin  Tablet 500 milliGRAM(s) Oral every 24 hours  montelukast 10 milliGRAM(s) Oral daily  predniSONE   Tablet 40 milliGRAM(s) Oral daily  sodium chloride 0.9% lock flush 3 milliLiter(s) IV Push every 8 hours    MEDICATIONS  (PRN):  acetaminophen   Tablet .. 650 milliGRAM(s) Oral every 6 hours PRN Temp greater or equal to 38C (100.4F), Mild Pain (1 - 3), Moderate Pain (4 - 6)  albuterol/ipratropium for Nebulization. 3 milliLiter(s) Nebulizer every 6 hours PRN Shortness of Breath and/or Wheezing      CAPILLARY BLOOD GLUCOSE        I&O's Summary      PHYSICAL EXAM:  Vital Signs Last 24 Hrs  T(C): 36.8 (01 Oct 2020 06:15), Max: 36.8 (01 Oct 2020 06:15)  T(F): 98.2 (01 Oct 2020 06:15), Max: 98.2 (01 Oct 2020 06:15)  HR: 69 (01 Oct 2020 06:15) (60 - 77)  BP: 123/82 (01 Oct 2020 06:15) (122/66 - 141/84)  BP(mean): --  RR: 19 (01 Oct 2020 06:15) (13 - 21)  SpO2: 96% (01 Oct 2020 06:15) (91% - 98%)    CONSTITUTIONAL: NAD, well-developed  RESPIRATORY: Normal respiratory effort; lungs are clear to auscultation bilaterally  CARDIOVASCULAR: Regular rate and rhythm, normal S1 and S2, no murmur/rub/gallop; No lower extremity edema; Peripheral pulses are 2+ bilaterally  ABDOMEN: Nontender to palpation, normoactive bowel sounds, no rebound/guarding; No hepatosplenomegaly  MUSCLOSKELETAL: no clubbing or cyanosis of digits; no joint swelling or tenderness to palpation  PSYCH: A+O to person, place, and time; affect appropriate  NEURO:  SKIN:    LABS:                        15.2   10.09 )-----------( 217      ( 01 Oct 2020 05:55 )             45.3     10-01    139  |  95<L>  |  16  ----------------------------<  191<H>  4.3   |  26  |  0.68    Ca    9.9      01 Oct 2020 05:55  Phos  4.0     10-01  Mg     1.8     10-01    TPro  7.0  /  Alb  4.4  /  TBili  < 0.2<L>  /  DBili  x   /  AST  18  /  ALT  39  /  AlkPhos  64  09-30    PT/INR - ( 30 Sep 2020 18:35 )   PT: 10.9 SEC;   INR: 0.95          PTT - ( 30 Sep 2020 18:35 )  PTT:28.5 SEC            RADIOLOGY & ADDITIONAL TESTS:  Results Reviewed:   Imaging Personally Reviewed:  Electrocardiogram Personally Reviewed:    COORDINATION OF CARE:  Care Discussed with Consultants/Other Providers [Y/N]:  Prior or Outpatient Records Reviewed [Y/N]:   Judith Miner  Hospitalist  Pager- [phone]    Patient is a 45y old  Male who presents with a chief complaint of SOB X 1 week (01 Oct 2020 04:16)      SUBJECTIVE / OVERNIGHT EVENTS: Pt seen and examined by me  Pt reports significant improvement in SOB, feels at baseline  Able to ambulated with SOB   Denies fever or chills     ADDITIONAL REVIEW OF SYSTEMS:    MEDICATIONS  (STANDING):  aspirin  chewable 81 milliGRAM(s) Oral daily  budesonide 160 MICROgram(s)/formoterol 4.5 MICROgram(s) Inhaler 2 Puff(s) Inhalation two times a day  diVALproex  milliGRAM(s) Oral two times a day  enoxaparin Injectable 40 milliGRAM(s) SubCutaneous daily  levoFLOXacin  Tablet 500 milliGRAM(s) Oral every 24 hours  montelukast 10 milliGRAM(s) Oral daily  predniSONE   Tablet 40 milliGRAM(s) Oral daily  sodium chloride 0.9% lock flush 3 milliLiter(s) IV Push every 8 hours    MEDICATIONS  (PRN):  acetaminophen   Tablet .. 650 milliGRAM(s) Oral every 6 hours PRN Temp greater or equal to 38C (100.4F), Mild Pain (1 - 3), Moderate Pain (4 - 6)  albuterol/ipratropium for Nebulization. 3 milliLiter(s) Nebulizer every 6 hours PRN Shortness of Breath and/or Wheezing      CAPILLARY BLOOD GLUCOSE        I&O's Summary      PHYSICAL EXAM:  Vital Signs Last 24 Hrs  T(C): 36.8 (01 Oct 2020 06:15), Max: 36.8 (01 Oct 2020 06:15)  T(F): 98.2 (01 Oct 2020 06:15), Max: 98.2 (01 Oct 2020 06:15)  HR: 69 (01 Oct 2020 06:15) (60 - 77)  BP: 123/82 (01 Oct 2020 06:15) (122/66 - 141/84)  BP(mean): --  RR: 19 (01 Oct 2020 06:15) (13 - 21)  SpO2: 96% (01 Oct 2020 06:15) (91% - 98%)    CONSTITUTIONAL: NAD, well-developed  RESPIRATORY: Normal respiratory effort; lungs are clear to auscultation bilaterally  CARDIOVASCULAR: Regular rate and rhythm, normal S1 and S2, no murmur/rub/gallop; No lower extremity edema; Peripheral pulses are 2+ bilaterally  ABDOMEN: Nontender to palpation, normoactive bowel sounds, no rebound/guarding; No hepatosplenomegaly  MUSCLOSKELETAL: no clubbing or cyanosis of digits; no joint swelling or tenderness to palpation  PSYCH: A+O to person, place, and time; affect appropriate  NEURO:No focal deficits  SKIN: Tattoos present     LABS:                        15.2   10.09 )-----------( 217      ( 01 Oct 2020 05:55 )             45.3     10-01    139  |  95<L>  |  16  ----------------------------<  191<H>  4.3   |  26  |  0.68    Ca    9.9      01 Oct 2020 05:55  Phos  4.0     10-01  Mg     1.8     10-01    TPro  7.0  /  Alb  4.4  /  TBili  < 0.2<L>  /  DBili  x   /  AST  18  /  ALT  39  /  AlkPhos  64  09-30    PT/INR - ( 30 Sep 2020 18:35 )   PT: 10.9 SEC;   INR: 0.95          PTT - ( 30 Sep 2020 18:35 )  PTT:28.5 SEC            RADIOLOGY & ADDITIONAL TESTS:  Results Reviewed:   Imaging Personally Reviewed:  Electrocardiogram Personally Reviewed:    COORDINATION OF CARE:  Care Discussed with Consultants/Other Providers [Y/N]:  Prior or Outpatient Records Reviewed [Y/N]:

## 2020-10-01 NOTE — DISCHARGE NOTE PROVIDER - CARE PROVIDER_API CALL
Angel Claire  MEDICINE - PULMONARY MEDICINE  [address] [place] [address], [place] [address]  Phone: [phone]  Fax: [phone]  Established Patient  Follow Up Time:    Angel Claire  MEDICINE - PULMONARY MEDICINE  [address] [place] [address], [place] [address]  Phone: [phone]  Fax: [phone]  Established Patient  Follow Up Time: 1 week

## 2020-10-01 NOTE — H&P ADULT - ATTENDING COMMENTS
I have personally seen, examined, and participated in the care of this patient.  I have reviewed all pertinent clinical information, including history, physical exam, plan, and the PA's note and agree except as noted.    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 Chest pain is left sided, comes and goes, worsened with deep inspiration,  5/10, nonradiating. Had visited [facility] and had X ray with abnormality and told he would likely need a CT. Took azithromycin (wife’s medication) and steroids (his own prescribed) on his own at home initially with improvement but then SOB returned. SOB comes and goes and not necessarily made worse with exertion but at time significantly limits him the last few days. No change in cough or phlegm. In ED CTA without PTX or PE.    #COPD exacerbation  SOB, CO2 retention and subjectively improved with nebs/steroids. Though no cough or phlegm, presentation likely c/w COPD exacerbation. CP likely related to bullous disease and COPD. Continue with levaquin 500mg qd PO, prednisone 40mg qd, duonebs PRN. SOB is much improved and currently no wheezing on exam. Would benefit with pulm f/u as outpatient - follows with Dr. Claire and has appt Tuesday. Continue with home symbicort and singulair    #Chest pain  ACS ruled out trops 7-->6, EKG nonischemic. CTA without PE or PTX or consolidation. Atypical chest pain in nature and likely related to bullous disease/COPD    #Bipolar disorder?  Continue with depakote

## 2020-10-06 ENCOUNTER — APPOINTMENT (OUTPATIENT)
Dept: PULMONOLOGY | Facility: CLINIC | Age: 45
End: 2020-10-06
Payer: COMMERCIAL

## 2020-10-06 VITALS
BODY MASS INDEX: 37.8 KG/M2 | TEMPERATURE: 98.6 F | SYSTOLIC BLOOD PRESSURE: 127 MMHG | HEART RATE: 87 BPM | RESPIRATION RATE: 16 BRPM | DIASTOLIC BLOOD PRESSURE: 86 MMHG | WEIGHT: 270 LBS | HEIGHT: 71 IN

## 2020-10-06 DIAGNOSIS — R06.00 DYSPNEA, UNSPECIFIED: ICD-10-CM

## 2020-10-06 PROCEDURE — 99214 OFFICE O/P EST MOD 30 MIN: CPT

## 2020-10-06 NOTE — REASON FOR VISIT
[Follow-Up] : a follow-up visit [Emphysema] : emphysema [Abnormal CXR/ Chest CT] : an abnormal CXR/ chest CT [Shortness of Breath] : shortness of breath [Spouse] : spouse

## 2020-10-06 NOTE — REVIEW OF SYSTEMS
[Fatigue] : fatigue [Chills] : chills [Cough] : cough [Dyspnea] : dyspnea [SOB on Exertion] : sob on exertion [Negative] : Musculoskeletal

## 2020-10-06 NOTE — ASSESSMENT
[FreeTextEntry1] : 45 year old male with pmh former smoker with bullous emphysema s/p B/L bullectomy in 2015 presents for follow up. CT scan is consistent with severe bolus disease and emphysema which was not appreciated on chest xray. \par I also counseled both the patient and his wife regarding symptoms of pneumothorax reason touch with me.

## 2020-10-06 NOTE — HISTORY OF PRESENT ILLNESS
[TextBox_4] : 45 year old male with pmh former smoker with bullous emphysema s/p B/L bullectomy in 2015 presents for follow up. 2 weeks ago pt developed body aches and pains in his lungs.  He felt very short of breath and decided to get COVID tested which was negative.  He also self started Zpack and prednisone because he was concerned the shortness of breath would become worse.  When he still felt unwell, he went to urgent care and had a chest xray which showed a nodular density over the left posterior 9th rib within the lung or bone.  He then went for a follow up CT scan which showed severe bolus disease and emphysema within the upper lungs, right greater than left \par Pt is currently feeling better overall but wanted further evaluation regarding the nodular density. \par He has been taking Symbicort and Ventolin as needed and denies having any significant respiratory issues prior to this episode.

## 2020-10-06 NOTE — PHYSICAL EXAM
[No Acute Distress] : no acute distress [Normal Appearance] : normal appearance [Normal Rate/Rhythm] : normal rate/rhythm [Normal S1, S2] : normal s1, s2 [No Murmurs] : no murmurs [No Resp Distress] : no resp distress [Clear to Auscultation Bilaterally] : clear to auscultation bilaterally [No Abnormalities] : no abnormalities [Normal Gait] : normal gait [No Clubbing] : no clubbing [No Cyanosis] : no cyanosis [No Edema] : no edema [No Focal Deficits] : no focal deficits [Oriented x3] : oriented x3 [Normal Affect] : normal affect [TextBox_125] : star

## 2021-07-21 ENCOUNTER — TRANSCRIPTION ENCOUNTER (OUTPATIENT)
Age: 46
End: 2021-07-21

## 2021-07-21 ENCOUNTER — EMERGENCY (EMERGENCY)
Facility: HOSPITAL | Age: 46
LOS: 1 days | Discharge: HOME | End: 2021-07-21
Attending: EMERGENCY MEDICINE | Admitting: EMERGENCY MEDICINE
Payer: COMMERCIAL

## 2021-07-21 VITALS
TEMPERATURE: 98 F | OXYGEN SATURATION: 100 % | SYSTOLIC BLOOD PRESSURE: 119 MMHG | HEIGHT: 71 IN | HEART RATE: 78 BPM | DIASTOLIC BLOOD PRESSURE: 82 MMHG | RESPIRATION RATE: 18 BRPM

## 2021-07-21 VITALS
TEMPERATURE: 98 F | OXYGEN SATURATION: 96 % | SYSTOLIC BLOOD PRESSURE: 123 MMHG | HEART RATE: 60 BPM | DIASTOLIC BLOOD PRESSURE: 86 MMHG | RESPIRATION RATE: 18 BRPM

## 2021-07-21 DIAGNOSIS — Z98.890 OTHER SPECIFIED POSTPROCEDURAL STATES: Chronic | ICD-10-CM

## 2021-07-21 LAB
ALBUMIN SERPL ELPH-MCNC: 4.5 G/DL — SIGNIFICANT CHANGE UP (ref 3.3–5)
ALP SERPL-CCNC: 106 U/L — SIGNIFICANT CHANGE UP (ref 40–120)
ALT FLD-CCNC: 330 U/L — HIGH (ref 4–41)
ANION GAP SERPL CALC-SCNC: 16 MMOL/L — HIGH (ref 7–14)
APPEARANCE UR: CLEAR — SIGNIFICANT CHANGE UP
AST SERPL-CCNC: 59 U/L — HIGH (ref 4–40)
BASOPHILS # BLD AUTO: 0.1 K/UL — SIGNIFICANT CHANGE UP (ref 0–0.2)
BASOPHILS NFR BLD AUTO: 1 % — SIGNIFICANT CHANGE UP (ref 0–2)
BILIRUB SERPL-MCNC: 0.5 MG/DL — SIGNIFICANT CHANGE UP (ref 0.2–1.2)
BILIRUB UR-MCNC: NEGATIVE — SIGNIFICANT CHANGE UP
BLOOD GAS VENOUS COMPREHENSIVE RESULT: SIGNIFICANT CHANGE UP
BUN SERPL-MCNC: 15 MG/DL — SIGNIFICANT CHANGE UP (ref 7–23)
CALCIUM SERPL-MCNC: 9.8 MG/DL — SIGNIFICANT CHANGE UP (ref 8.4–10.5)
CHLORIDE SERPL-SCNC: 101 MMOL/L — SIGNIFICANT CHANGE UP (ref 98–107)
CO2 SERPL-SCNC: 25 MMOL/L — SIGNIFICANT CHANGE UP (ref 22–31)
COLOR SPEC: YELLOW — SIGNIFICANT CHANGE UP
CREAT SERPL-MCNC: 0.89 MG/DL — SIGNIFICANT CHANGE UP (ref 0.5–1.3)
DIFF PNL FLD: NEGATIVE — SIGNIFICANT CHANGE UP
EOSINOPHIL # BLD AUTO: 0.21 K/UL — SIGNIFICANT CHANGE UP (ref 0–0.5)
EOSINOPHIL NFR BLD AUTO: 2.1 % — SIGNIFICANT CHANGE UP (ref 0–6)
GLUCOSE SERPL-MCNC: 108 MG/DL — HIGH (ref 70–99)
GLUCOSE UR QL: NEGATIVE — SIGNIFICANT CHANGE UP
HCT VFR BLD CALC: 47.6 % — SIGNIFICANT CHANGE UP (ref 39–50)
HGB BLD-MCNC: 15.9 G/DL — SIGNIFICANT CHANGE UP (ref 13–17)
IANC: 6.02 K/UL — SIGNIFICANT CHANGE UP (ref 1.5–8.5)
IMM GRANULOCYTES NFR BLD AUTO: 1 % — SIGNIFICANT CHANGE UP (ref 0–1.5)
KETONES UR-MCNC: NEGATIVE — SIGNIFICANT CHANGE UP
LACTATE BLDV-MCNC: 1 MMOL/L — SIGNIFICANT CHANGE UP (ref 0.5–2)
LEUKOCYTE ESTERASE UR-ACNC: NEGATIVE — SIGNIFICANT CHANGE UP
LIDOCAIN IGE QN: 25 U/L — SIGNIFICANT CHANGE UP (ref 7–60)
LYMPHOCYTES # BLD AUTO: 2.75 K/UL — SIGNIFICANT CHANGE UP (ref 1–3.3)
LYMPHOCYTES # BLD AUTO: 27.7 % — SIGNIFICANT CHANGE UP (ref 13–44)
MCHC RBC-ENTMCNC: 30.7 PG — SIGNIFICANT CHANGE UP (ref 27–34)
MCHC RBC-ENTMCNC: 33.4 GM/DL — SIGNIFICANT CHANGE UP (ref 32–36)
MCV RBC AUTO: 91.9 FL — SIGNIFICANT CHANGE UP (ref 80–100)
MONOCYTES # BLD AUTO: 0.76 K/UL — SIGNIFICANT CHANGE UP (ref 0–0.9)
MONOCYTES NFR BLD AUTO: 7.6 % — SIGNIFICANT CHANGE UP (ref 2–14)
NEUTROPHILS # BLD AUTO: 6.02 K/UL — SIGNIFICANT CHANGE UP (ref 1.8–7.4)
NEUTROPHILS NFR BLD AUTO: 60.6 % — SIGNIFICANT CHANGE UP (ref 43–77)
NITRITE UR-MCNC: NEGATIVE — SIGNIFICANT CHANGE UP
NRBC # BLD: 0 /100 WBCS — SIGNIFICANT CHANGE UP
NRBC # FLD: 0 K/UL — SIGNIFICANT CHANGE UP
PH UR: 6 — SIGNIFICANT CHANGE UP (ref 5–8)
PLATELET # BLD AUTO: 226 K/UL — SIGNIFICANT CHANGE UP (ref 150–400)
POTASSIUM SERPL-MCNC: 4.6 MMOL/L — SIGNIFICANT CHANGE UP (ref 3.5–5.3)
POTASSIUM SERPL-SCNC: 4.6 MMOL/L — SIGNIFICANT CHANGE UP (ref 3.5–5.3)
PROT SERPL-MCNC: 7.7 G/DL — SIGNIFICANT CHANGE UP (ref 6–8.3)
PROT UR-MCNC: ABNORMAL
RBC # BLD: 5.18 M/UL — SIGNIFICANT CHANGE UP (ref 4.2–5.8)
RBC # FLD: 12.5 % — SIGNIFICANT CHANGE UP (ref 10.3–14.5)
SODIUM SERPL-SCNC: 142 MMOL/L — SIGNIFICANT CHANGE UP (ref 135–145)
SP GR SPEC: 1.03 — HIGH (ref 1.01–1.02)
UROBILINOGEN FLD QL: SIGNIFICANT CHANGE UP
WBC # BLD: 9.94 K/UL — SIGNIFICANT CHANGE UP (ref 3.8–10.5)
WBC # FLD AUTO: 9.94 K/UL — SIGNIFICANT CHANGE UP (ref 3.8–10.5)

## 2021-07-21 PROCEDURE — 74176 CT ABD & PELVIS W/O CONTRAST: CPT | Mod: 26

## 2021-07-21 PROCEDURE — 99285 EMERGENCY DEPT VISIT HI MDM: CPT

## 2021-07-21 PROCEDURE — 76705 ECHO EXAM OF ABDOMEN: CPT | Mod: 26

## 2021-07-21 RX ORDER — SODIUM CHLORIDE 9 MG/ML
1000 INJECTION INTRAMUSCULAR; INTRAVENOUS; SUBCUTANEOUS ONCE
Refills: 0 | Status: COMPLETED | OUTPATIENT
Start: 2021-07-21 | End: 2021-07-21

## 2021-07-21 RX ORDER — MORPHINE SULFATE 50 MG/1
4 CAPSULE, EXTENDED RELEASE ORAL ONCE
Refills: 0 | Status: DISCONTINUED | OUTPATIENT
Start: 2021-07-21 | End: 2021-07-21

## 2021-07-21 RX ADMIN — SODIUM CHLORIDE 1000 MILLILITER(S): 9 INJECTION INTRAMUSCULAR; INTRAVENOUS; SUBCUTANEOUS at 11:22

## 2021-07-21 RX ADMIN — MORPHINE SULFATE 4 MILLIGRAM(S): 50 CAPSULE, EXTENDED RELEASE ORAL at 12:56

## 2021-07-21 NOTE — ED PROVIDER NOTE - CLINICAL SUMMARY MEDICAL DECISION MAKING FREE TEXT BOX
45 y/o male with a hx of Bipolar presents to the ER c/o 5 days of left flank pain, hematuria and dysuria. Pt is well appearing, NAD, +TTP left flank/CVA, will check labs, CT, UA, reassess 45 y/o male with a hx of Bipolar presents to the ER c/o 5 days of left flank pain, hematuria and dysuria. Pt is well appearing, NAD, +TTP left flank/CVA, will check labs, CT r/o kidney stone, UA, reassess.

## 2021-07-21 NOTE — ED PROVIDER NOTE - ATTENDING CONTRIBUTION TO CARE
I performed a face to face evaluation of this patient and obtained a history and performed a full exam.  I agree with the history, physical exam and plan of the PA.    Brief HPI:  46 pmh Bipolar disorder, ptx presents to the ER with luq and left flank pain.  Patient states pain started 5 days ago, sharp, constant, associated with hematuria.  Patient denies history of kidney stones.  Denies fever, chills, nausea, vomiting.      Vitals:   Reviewed    Exam:    GEN:  Non-toxic appearing, non-distressed, speaking full sentences, non-diaphoretic, AAOx3  HEENT:  NCAT, neck supple, EOMI, PERRLA, sclera anicteric, no conjunctival pallor or injection, no stridor, normal voice, no tonsillar exudate, uvula midline  CV:  regular rhythm and rate, s1/s2 audible, no murmurs, rubs or gallops, peripheral pulses 2+ and symmetric  PULM:  non-labored respirations, lungs clear to auscultation bilaterally, no wheezes, crackles or rales  ABD:  non distended, mild ttp in luq, no rebound, no guarding, negative Aguirre's sign, bowel sounds normal, no cvat  MSK:  no gross deformity, non-tender extremities and joints, range of motion grossly normal appearing, no extremity edema, extremities warm and well perfused   NEURO:  AAOx3, CN II-XII intact, motor 5/5 in upper and lower extremities bilaterally, sensation grossly intact in extremities and trunk  SKIN:  warm, dry, no rash or vesicles     A/P:  46 pmh Bipolar disorder presents to the ER with luq and left flank pain x5 days associated with hematuria.  VSS AF.  Exam non-toxic appearing, mild ttp in luq abdomen but non-peritoneal.  Possible renal colic vs. pyelo.   Low c/f aaa or dissection at this time.  Plan for labs, ct, ua, supportive care.  Dispo pending.

## 2021-07-21 NOTE — ED PROVIDER NOTE - PATIENT PORTAL LINK FT
You can access the FollowMyHealth Patient Portal offered by [facility] by registering at the following website: [email or web address]. By joining [employer]’s FollowMyHealth portal, you will also be able to view your health information using other applications (apps) compatible with our system.

## 2021-07-21 NOTE — ED PROVIDER NOTE - NSFOLLOWUPINSTRUCTIONS_ED_ALL_ED_FT
Follow up with your Primary Medical Doctor in 1-2 days.  Bring a copy of your results to your appointment.  Rest.  Drink plenty of fluids.  Return to the ER for any persistent/worsening or new symptoms nausea, vomiting, pain, fevers, chills or any concerning symptoms.

## 2021-07-21 NOTE — ED ADULT TRIAGE NOTE - CHIEF COMPLAINT QUOTE
Pt co hematuria and dysuria  with lower abdominal pain that radiates  to back area since  Saturday night.

## 2021-07-21 NOTE — ED PROVIDER NOTE - PROGRESS NOTE DETAILS
ISHA Bolivar: pt feels better ambulating without difficulty.  Results reviewed with patient.  Discharge reviewed and discussed with patient.  Pt provided a copy of his results and was advised to bring a copy to his PMD.  Pt advised of abnormal findings.  Pt discharged by STEFAN Barroso.

## 2021-07-21 NOTE — ED ADULT NURSE NOTE - OBJECTIVE STATEMENT
Pt c/o of left flank pain, radiates to back and hematuria. Pt endorses chills. Denies dysuria, fever, vomiting, diarrhea, sob. Respirations even/unlabored, nad noted, 20g iv to right ac, labs drawn and sent.

## 2021-07-22 LAB
CULTURE RESULTS: NO GROWTH — SIGNIFICANT CHANGE UP
SPECIMEN SOURCE: SIGNIFICANT CHANGE UP

## 2021-07-27 ENCOUNTER — APPOINTMENT (OUTPATIENT)
Dept: GASTROENTEROLOGY | Facility: CLINIC | Age: 46
End: 2021-07-27
Payer: COMMERCIAL

## 2021-07-27 VITALS
WEIGHT: 262 LBS | DIASTOLIC BLOOD PRESSURE: 79 MMHG | OXYGEN SATURATION: 90 % | RESPIRATION RATE: 18 BRPM | HEART RATE: 89 BPM | TEMPERATURE: 97.7 F | HEIGHT: 71 IN | SYSTOLIC BLOOD PRESSURE: 111 MMHG | BODY MASS INDEX: 36.68 KG/M2

## 2021-07-27 DIAGNOSIS — Z23 ENCOUNTER FOR IMMUNIZATION: ICD-10-CM

## 2021-07-27 DIAGNOSIS — R10.9 UNSPECIFIED ABDOMINAL PAIN: ICD-10-CM

## 2021-07-27 PROCEDURE — 99204 OFFICE O/P NEW MOD 45 MIN: CPT | Mod: 25

## 2021-07-27 PROCEDURE — 99072 ADDL SUPL MATRL&STAF TM PHE: CPT

## 2021-07-27 PROCEDURE — 36415 COLL VENOUS BLD VENIPUNCTURE: CPT

## 2021-07-27 RX ORDER — MONTELUKAST 10 MG/1
10 TABLET, FILM COATED ORAL
Refills: 0 | Status: ACTIVE | COMMUNITY

## 2021-07-27 RX ORDER — BUDESONIDE AND FORMOTEROL FUMARATE DIHYDRATE 160; 4.5 UG/1; UG/1
160-4.5 AEROSOL RESPIRATORY (INHALATION)
Refills: 0 | Status: ACTIVE | COMMUNITY

## 2021-07-27 RX ORDER — SODIUM PICOSULFATE, MAGNESIUM OXIDE, AND ANHYDROUS CITRIC ACID 10; 3.5; 12 MG/160ML; G/160ML; G/160ML
10-3.5-12 MG-GM LIQUID ORAL
Qty: 1 | Refills: 0 | Status: ACTIVE | COMMUNITY
Start: 2021-07-27 | End: 1900-01-01

## 2021-07-27 RX ORDER — FLUOXETINE HYDROCHLORIDE 20 MG/5ML
20 SOLUTION ORAL
Refills: 0 | Status: ACTIVE | COMMUNITY

## 2021-07-27 RX ORDER — DIVALPROEX SODIUM 500 MG/1
500 TABLET, DELAYED RELEASE ORAL
Refills: 0 | Status: ACTIVE | COMMUNITY

## 2021-07-27 NOTE — ED PROVIDER NOTE - NSCAREINITIATED _GEN_ER

## 2021-07-28 LAB
ALBUMIN SERPL ELPH-MCNC: 5.1 G/DL
ALP BLD-CCNC: 101 U/L
ALT SERPL-CCNC: 86 U/L
ANION GAP SERPL CALC-SCNC: 13 MMOL/L
AST SERPL-CCNC: 35 U/L
BILIRUB SERPL-MCNC: 0.4 MG/DL
BUN SERPL-MCNC: 16 MG/DL
CALCIUM SERPL-MCNC: 10.4 MG/DL
CHLORIDE SERPL-SCNC: 100 MMOL/L
CO2 SERPL-SCNC: 30 MMOL/L
CREAT SERPL-MCNC: 1 MG/DL
GLUCOSE SERPL-MCNC: 97 MG/DL
POTASSIUM SERPL-SCNC: 4.6 MMOL/L
PROT SERPL-MCNC: 7.8 G/DL
SODIUM SERPL-SCNC: 143 MMOL/L

## 2021-07-28 NOTE — PHYSICAL EXAM
[General Appearance - Alert] : alert [General Appearance - In No Acute Distress] : in no acute distress [Sclera] : the sclera and conjunctiva were normal [PERRL With Normal Accommodation] : pupils were equal in size, round, and reactive to light [Extraocular Movements] : extraocular movements were intact [Outer Ear] : the ears and nose were normal in appearance [Oropharynx] : the oropharynx was normal [Neck Appearance] : the appearance of the neck was normal [Neck Cervical Mass (___cm)] : no neck mass was observed [Jugular Venous Distention Increased] : there was no jugular-venous distention [Thyroid Diffuse Enlargement] : the thyroid was not enlarged [Thyroid Nodule] : there were no palpable thyroid nodules [] : no respiratory distress [Auscultation Breath Sounds / Voice Sounds] : lungs were clear to auscultation bilaterally [Heart Sounds] : normal S1 and S2 [Heart Rate And Rhythm] : heart rate was normal and rhythm regular [Heart Sounds Gallop] : no gallops [Murmurs] : no murmurs [Heart Sounds Pericardial Friction Rub] : no pericardial rub [Cervical Lymph Nodes Enlarged Posterior Bilaterally] : posterior cervical [Cervical Lymph Nodes Enlarged Anterior Bilaterally] : anterior cervical [Supraclavicular Lymph Nodes Enlarged Bilaterally] : supraclavicular [Axillary Lymph Nodes Enlarged Bilaterally] : axillary [Femoral Lymph Nodes Enlarged Bilaterally] : femoral [Inguinal Lymph Nodes Enlarged Bilaterally] : inguinal [No CVA Tenderness] : no ~M costovertebral angle tenderness [No Spinal Tenderness] : no spinal tenderness [Abnormal Walk] : normal gait [Nail Clubbing] : no clubbing  or cyanosis of the fingernails [Motor Tone] : muscle strength and tone were normal [Musculoskeletal - Swelling] : no joint swelling seen [Deep Tendon Reflexes (DTR)] : deep tendon reflexes were 2+ and symmetric [Sensation] : the sensory exam was normal to light touch and pinprick [No Focal Deficits] : no focal deficits [Oriented To Time, Place, And Person] : oriented to person, place, and time [Impaired Insight] : insight and judgment were intact [Affect] : the affect was normal [Obese] : obese [Normal] : normal to percussion [RUQ] : in the right upper quadrant [Firm] : not firm [Rigid] : not rigid [Rebound] : no rebound [Guarding] : no guarding [Aguirre's] : a negative Aguirre's sign

## 2021-07-28 NOTE — REVIEW OF SYSTEMS
[As Noted in HPI] : as noted in HPI [Negative] : Heme/Lymph [Abdominal Pain] : abdominal pain [Vomiting] : vomiting [Constipation] : constipation [Diarrhea] : no diarrhea [Heartburn] : no heartburn [Melena] : no melena [FreeTextEntry7] : Nausea

## 2021-07-28 NOTE — ASSESSMENT
[FreeTextEntry1] : Attending Attestation\par \par RUQ Pain\par MRCP ordered The risks benefits alternatives and complications of the procedure/s were explained to the patient at length. The patient was agreeable and we will proceed.\par If positive will need ERCP. \par \par Repeat LFTs blood results pending. \par \par Positive Cologuard \par \par Colonoscopy ordered The risks benefits alternatives and complications of the procedure/s were explained to the patient at length. The patient was agreeable and we will proceed. \par \par Constipation\par The causes of constipation were discussed at length We discussed : Eat three meals each day. Do not\par skip meals. Gradually increase the amount of FIBER in your diet. Choose more whole grain breads,\par cereals and rice. Select more raw fruits and vegetables eat the peel, if appropriate. Read food labels\par and look for the "dietary fiber" content of foods. Good sources have 2 grams of fiber or more. Drink six\par to eight glasses of water each day. Limit highly refined and processed foods.\par \par Patient will continue to take Linzess 145 mcg daily. \par \par \par Patient verbalized understand of all information provided. All questions answered and reviewed.\par \par \par \par \par \par \par \par P

## 2021-07-28 NOTE — REASON FOR VISIT
[Consultation] : a consultation visit [FreeTextEntry1] : Abdominal Pain, Gallstones, Nausea and Vomiting

## 2021-08-01 ENCOUNTER — APPOINTMENT (OUTPATIENT)
Dept: MRI IMAGING | Facility: IMAGING CENTER | Age: 46
End: 2021-08-01
Payer: COMMERCIAL

## 2021-08-01 ENCOUNTER — OUTPATIENT (OUTPATIENT)
Dept: OUTPATIENT SERVICES | Facility: HOSPITAL | Age: 46
LOS: 1 days | End: 2021-08-01
Payer: COMMERCIAL

## 2021-08-01 DIAGNOSIS — Z98.890 OTHER SPECIFIED POSTPROCEDURAL STATES: Chronic | ICD-10-CM

## 2021-08-01 DIAGNOSIS — Z00.8 ENCOUNTER FOR OTHER GENERAL EXAMINATION: ICD-10-CM

## 2021-08-01 DIAGNOSIS — K80.20 CALCULUS OF GALLBLADDER WITHOUT CHOLECYSTITIS WITHOUT OBSTRUCTION: ICD-10-CM

## 2021-08-01 PROCEDURE — 74183 MRI ABD W/O CNTR FLWD CNTR: CPT

## 2021-08-01 PROCEDURE — 74183 MRI ABD W/O CNTR FLWD CNTR: CPT | Mod: 26

## 2021-08-01 PROCEDURE — A9585: CPT

## 2021-08-04 RX ORDER — POLYETHYLENE GLYCOL 3350 AND ELECTROLYTES WITH LEMON FLAVOR 236; 22.74; 6.74; 5.86; 2.97 G/4L; G/4L; G/4L; G/4L; G/4L
236 POWDER, FOR SOLUTION ORAL
Qty: 1 | Refills: 0 | Status: ACTIVE | COMMUNITY
Start: 2021-08-04 | End: 1900-01-01

## 2021-08-06 DIAGNOSIS — K59.00 CONSTIPATION, UNSPECIFIED: ICD-10-CM

## 2021-08-06 RX ORDER — LINACLOTIDE 145 UG/1
145 CAPSULE, GELATIN COATED ORAL DAILY
Qty: 30 | Refills: 1 | Status: ACTIVE | COMMUNITY
Start: 1900-01-01 | End: 1900-01-01

## 2021-08-16 ENCOUNTER — APPOINTMENT (OUTPATIENT)
Dept: GASTROENTEROLOGY | Facility: CLINIC | Age: 46
End: 2021-08-16

## 2021-08-16 DIAGNOSIS — Z20.822 CONTACT WITH AND (SUSPECTED) EXPOSURE TO COVID-19: ICD-10-CM

## 2021-08-17 LAB — SARS-COV-2 N GENE NPH QL NAA+PROBE: NOT DETECTED

## 2021-08-18 ENCOUNTER — RESULT REVIEW (OUTPATIENT)
Age: 46
End: 2021-08-18

## 2021-08-18 ENCOUNTER — APPOINTMENT (OUTPATIENT)
Dept: GASTROENTEROLOGY | Facility: HOSPITAL | Age: 46
End: 2021-08-18
Payer: COMMERCIAL

## 2021-08-18 ENCOUNTER — OUTPATIENT (OUTPATIENT)
Dept: OUTPATIENT SERVICES | Facility: HOSPITAL | Age: 46
LOS: 1 days | Discharge: HOME | End: 2021-08-18
Payer: COMMERCIAL

## 2021-08-18 VITALS
HEART RATE: 76 BPM | RESPIRATION RATE: 15 BRPM | SYSTOLIC BLOOD PRESSURE: 115 MMHG | DIASTOLIC BLOOD PRESSURE: 54 MMHG | OXYGEN SATURATION: 98 %

## 2021-08-18 VITALS
WEIGHT: 261.03 LBS | DIASTOLIC BLOOD PRESSURE: 75 MMHG | RESPIRATION RATE: 12 BRPM | HEART RATE: 75 BPM | OXYGEN SATURATION: 97 % | SYSTOLIC BLOOD PRESSURE: 125 MMHG | TEMPERATURE: 98 F | HEIGHT: 71 IN

## 2021-08-18 DIAGNOSIS — Z98.890 OTHER SPECIFIED POSTPROCEDURAL STATES: Chronic | ICD-10-CM

## 2021-08-18 DIAGNOSIS — Z12.11 ENCOUNTER FOR SCREENING FOR MALIGNANT NEOPLASM OF COLON: ICD-10-CM

## 2021-08-18 PROCEDURE — 45380 COLONOSCOPY AND BIOPSY: CPT | Mod: 33,59

## 2021-08-18 PROCEDURE — 45385 COLONOSCOPY W/LESION REMOVAL: CPT | Mod: 33

## 2021-08-18 PROCEDURE — 88305 TISSUE EXAM BY PATHOLOGIST: CPT | Mod: 26

## 2021-08-18 RX ORDER — SODIUM CHLORIDE 9 MG/ML
1000 INJECTION, SOLUTION INTRAVENOUS
Refills: 0 | Status: DISCONTINUED | OUTPATIENT
Start: 2021-08-18 | End: 2021-09-02

## 2021-08-19 LAB — SURGICAL PATHOLOGY STUDY: SIGNIFICANT CHANGE UP

## 2021-10-12 ENCOUNTER — APPOINTMENT (OUTPATIENT)
Dept: GASTROENTEROLOGY | Facility: CLINIC | Age: 46
End: 2021-10-12
Payer: COMMERCIAL

## 2021-10-12 VITALS
WEIGHT: 262 LBS | HEIGHT: 71 IN | OXYGEN SATURATION: 95 % | RESPIRATION RATE: 18 BRPM | SYSTOLIC BLOOD PRESSURE: 127 MMHG | BODY MASS INDEX: 36.68 KG/M2 | TEMPERATURE: 97 F | HEART RATE: 93 BPM | DIASTOLIC BLOOD PRESSURE: 83 MMHG

## 2021-10-12 DIAGNOSIS — D12.6 BENIGN NEOPLASM OF COLON, UNSPECIFIED: ICD-10-CM

## 2021-10-12 DIAGNOSIS — R19.5 OTHER FECAL ABNORMALITIES: ICD-10-CM

## 2021-10-12 DIAGNOSIS — K80.20 CALCULUS OF GALLBLADDER W/OUT CHOLECYSTITIS W/OUT OBSTRUCTION: ICD-10-CM

## 2021-10-12 PROCEDURE — 99214 OFFICE O/P EST MOD 30 MIN: CPT

## 2021-10-12 NOTE — PHYSICAL EXAM
[General Appearance - Alert] : alert [General Appearance - In No Acute Distress] : in no acute distress [PERRL With Normal Accommodation] : pupils were equal in size, round, and reactive to light [Sclera] : the sclera and conjunctiva were normal [Extraocular Movements] : extraocular movements were intact [Outer Ear] : the ears and nose were normal in appearance [Oropharynx] : the oropharynx was normal [Neck Appearance] : the appearance of the neck was normal [Neck Cervical Mass (___cm)] : no neck mass was observed [Jugular Venous Distention Increased] : there was no jugular-venous distention [Thyroid Diffuse Enlargement] : the thyroid was not enlarged [Thyroid Nodule] : there were no palpable thyroid nodules [Auscultation Breath Sounds / Voice Sounds] : lungs were clear to auscultation bilaterally [Heart Rate And Rhythm] : heart rate was normal and rhythm regular [Heart Sounds] : normal S1 and S2 [Heart Sounds Gallop] : no gallops [Murmurs] : no murmurs [Heart Sounds Pericardial Friction Rub] : no pericardial rub [Bowel Sounds] : normal bowel sounds [Abdomen Soft] : soft [Abdomen Tenderness] : non-tender [] : no hepato-splenomegaly [Abdomen Mass (___ Cm)] : no abdominal mass palpated [No CVA Tenderness] : no ~M costovertebral angle tenderness [No Spinal Tenderness] : no spinal tenderness [Abnormal Walk] : normal gait [Nail Clubbing] : no clubbing  or cyanosis of the fingernails [Musculoskeletal - Swelling] : no joint swelling seen [Motor Tone] : muscle strength and tone were normal [Oriented To Time, Place, And Person] : oriented to person, place, and time [Impaired Insight] : insight and judgment were intact [Affect] : the affect was normal

## 2021-10-12 NOTE — HISTORY OF PRESENT ILLNESS
[FreeTextEntry1] : 45 y/o male with a Hx of Bipolar presented to the ER on 7/22/21\par c/o 5 days of left flank pain, hematuria cola colored urine and dysuria. Pt states he saw his PMD\par  and was told to go to the ER for evaluation. Pt denies fevers,\par chills, nausea, vomiting, diarrhea, chest pain, sob, penile pain, testicular\par pain. Pt states hematuria has resolved. Patient states pain started in upper abdomen that radiated to back. On July 17 he noticed Constipation became progressively worse he was prescribed Linzess 145 mcg daily with minimal relief. Patient has had 2 positive Cologuard last test 1 week ago. He has a significant family history Colon Cancer father and paternal grandfather both DX with colon cancer. Denies rectal bleeding, blood in the stool, melena, hematemesis. He Denies ETOH use. Nausea and Vomiting occurs frequently last episode today. Denies fever or chills. Urine Specific Gravity 1034, LFTs AST 59, . Bilirubin norm, no blood in urine\par \par Patient has not had any further episodes of biliary colic.  He had an MRCP that revealed gallstones but no CBD stones.  The common bile duct was 6 mm.\par \par Patient has a strong family history for colon cancer.  He had 2+ Cologuard test.  Colonoscopy revealed several colon polyps that were removed which were tubular adenomas.  A rectal polyp was hyperplastic.\par \par His bowel movements are regular.\par \par Patient had excision of 2 pulmonary bullae in the past.\par \par

## 2021-10-12 NOTE — ASSESSMENT
[FreeTextEntry1] : Patient with colonoscopy and removal of several tubular adenomas.  He does have a strong family history for colon cancer.\par He does have gallstones and had at least one episode of biliary colic.  MRI did not reveal any CBD stones.  He will continue on a low-fat diet.  He will be referred for laparoscopic cholecystectomy.

## 2021-10-21 ENCOUNTER — APPOINTMENT (OUTPATIENT)
Dept: PULMONOLOGY | Facility: CLINIC | Age: 46
End: 2021-10-21
Payer: COMMERCIAL

## 2021-10-21 VITALS
WEIGHT: 270 LBS | BODY MASS INDEX: 37.8 KG/M2 | HEIGHT: 71 IN | HEART RATE: 76 BPM | OXYGEN SATURATION: 95 % | DIASTOLIC BLOOD PRESSURE: 85 MMHG | TEMPERATURE: 97.1 F | SYSTOLIC BLOOD PRESSURE: 123 MMHG | RESPIRATION RATE: 17 BRPM

## 2021-10-21 DIAGNOSIS — J44.9 CHRONIC OBSTRUCTIVE PULMONARY DISEASE, UNSPECIFIED: ICD-10-CM

## 2021-10-21 PROCEDURE — 99213 OFFICE O/P EST LOW 20 MIN: CPT

## 2022-01-13 ENCOUNTER — OUTPATIENT (OUTPATIENT)
Dept: OUTPATIENT SERVICES | Facility: HOSPITAL | Age: 47
LOS: 1 days | End: 2022-01-13
Payer: COMMERCIAL

## 2022-01-13 VITALS
WEIGHT: 270.07 LBS | TEMPERATURE: 98 F | DIASTOLIC BLOOD PRESSURE: 78 MMHG | HEART RATE: 96 BPM | OXYGEN SATURATION: 97 % | SYSTOLIC BLOOD PRESSURE: 118 MMHG | RESPIRATION RATE: 14 BRPM | HEIGHT: 71 IN

## 2022-01-13 DIAGNOSIS — K80.20 CALCULUS OF GALLBLADDER WITHOUT CHOLECYSTITIS WITHOUT OBSTRUCTION: ICD-10-CM

## 2022-01-13 DIAGNOSIS — Z98.890 OTHER SPECIFIED POSTPROCEDURAL STATES: Chronic | ICD-10-CM

## 2022-01-13 DIAGNOSIS — K80.61 CALCULUS OF GALLBLADDER AND BILE DUCT WITH CHOLECYSTITIS, UNSPECIFIED, WITH OBSTRUCTION: ICD-10-CM

## 2022-01-13 LAB
ALBUMIN SERPL ELPH-MCNC: 4.7 G/DL — SIGNIFICANT CHANGE UP (ref 3.3–5)
ALP SERPL-CCNC: 76 U/L — SIGNIFICANT CHANGE UP (ref 40–120)
ALT FLD-CCNC: 39 U/L — SIGNIFICANT CHANGE UP (ref 4–41)
ANION GAP SERPL CALC-SCNC: 13 MMOL/L — SIGNIFICANT CHANGE UP (ref 7–14)
AST SERPL-CCNC: 17 U/L — SIGNIFICANT CHANGE UP (ref 4–40)
BILIRUB SERPL-MCNC: <0.2 MG/DL — SIGNIFICANT CHANGE UP (ref 0.2–1.2)
BUN SERPL-MCNC: 12 MG/DL — SIGNIFICANT CHANGE UP (ref 7–23)
CALCIUM SERPL-MCNC: 9.8 MG/DL — SIGNIFICANT CHANGE UP (ref 8.4–10.5)
CHLORIDE SERPL-SCNC: 99 MMOL/L — SIGNIFICANT CHANGE UP (ref 98–107)
CO2 SERPL-SCNC: 27 MMOL/L — SIGNIFICANT CHANGE UP (ref 22–31)
CREAT SERPL-MCNC: 0.73 MG/DL — SIGNIFICANT CHANGE UP (ref 0.5–1.3)
GLUCOSE SERPL-MCNC: 128 MG/DL — HIGH (ref 70–99)
HCT VFR BLD CALC: 45.8 % — SIGNIFICANT CHANGE UP (ref 39–50)
HGB BLD-MCNC: 15.8 G/DL — SIGNIFICANT CHANGE UP (ref 13–17)
MCHC RBC-ENTMCNC: 30.9 PG — SIGNIFICANT CHANGE UP (ref 27–34)
MCHC RBC-ENTMCNC: 34.5 GM/DL — SIGNIFICANT CHANGE UP (ref 32–36)
MCV RBC AUTO: 89.6 FL — SIGNIFICANT CHANGE UP (ref 80–100)
NRBC # BLD: 0 /100 WBCS — SIGNIFICANT CHANGE UP
NRBC # FLD: 0 K/UL — SIGNIFICANT CHANGE UP
PLATELET # BLD AUTO: 180 K/UL — SIGNIFICANT CHANGE UP (ref 150–400)
POTASSIUM SERPL-MCNC: 4 MMOL/L — SIGNIFICANT CHANGE UP (ref 3.5–5.3)
POTASSIUM SERPL-SCNC: 4 MMOL/L — SIGNIFICANT CHANGE UP (ref 3.5–5.3)
PROT SERPL-MCNC: 7.8 G/DL — SIGNIFICANT CHANGE UP (ref 6–8.3)
RBC # BLD: 5.11 M/UL — SIGNIFICANT CHANGE UP (ref 4.2–5.8)
RBC # FLD: 12.5 % — SIGNIFICANT CHANGE UP (ref 10.3–14.5)
SODIUM SERPL-SCNC: 139 MMOL/L — SIGNIFICANT CHANGE UP (ref 135–145)
WBC # BLD: 12.47 K/UL — HIGH (ref 3.8–10.5)
WBC # FLD AUTO: 12.47 K/UL — HIGH (ref 3.8–10.5)

## 2022-01-13 PROCEDURE — 93010 ELECTROCARDIOGRAM REPORT: CPT

## 2022-01-13 RX ORDER — DIVALPROEX SODIUM 500 MG/1
1 TABLET, DELAYED RELEASE ORAL
Qty: 0 | Refills: 0 | DISCHARGE

## 2022-01-13 RX ORDER — MONTELUKAST 4 MG/1
1 TABLET, CHEWABLE ORAL
Qty: 0 | Refills: 0 | DISCHARGE

## 2022-01-13 RX ORDER — FLUOXETINE HCL 10 MG
1 CAPSULE ORAL
Qty: 0 | Refills: 0 | DISCHARGE

## 2022-01-13 RX ORDER — BUDESONIDE AND FORMOTEROL FUMARATE DIHYDRATE 160; 4.5 UG/1; UG/1
2 AEROSOL RESPIRATORY (INHALATION)
Qty: 0 | Refills: 0 | DISCHARGE

## 2022-01-13 RX ORDER — SODIUM CHLORIDE 9 MG/ML
1000 INJECTION, SOLUTION INTRAVENOUS
Refills: 0 | Status: DISCONTINUED | OUTPATIENT
Start: 2022-01-31 | End: 2022-02-14

## 2022-01-13 RX ORDER — IPRATROPIUM/ALBUTEROL SULFATE 18-103MCG
0 AEROSOL WITH ADAPTER (GRAM) INHALATION
Qty: 0 | Refills: 0 | DISCHARGE

## 2022-01-13 NOTE — H&P PST ADULT - NSSUBSTANCEUSE_GEN_ALL_CORE_SD
oxycodone and percocet were prescribed became addicted for 3 yrs, last use 20 yrs ago/street drug/inhalant/medication abuse

## 2022-01-13 NOTE — H&P PST ADULT - NSANTHOSAYNRD_GEN_A_CORE
No. BRITTANY screening performed.  STOP BANG Legend: 0-2 = LOW Risk; 3-4 = INTERMEDIATE Risk; 5-8 = HIGH Risk 17 1/2 inches/No. BRITTANY screening performed.  STOP BANG Legend: 0-2 = LOW Risk; 3-4 = INTERMEDIATE Risk; 5-8 = HIGH Risk

## 2022-01-13 NOTE — H&P PST ADULT - GASTROINTESTINAL DETAILS
obese/soft/nontender/no masses palpable/bowel sounds normal/no bruit/no rebound tenderness/no guarding/no rigidity/no organomegaly

## 2022-01-13 NOTE — H&P PST ADULT - PROBLEM SELECTOR PLAN 1
Pt scheduled for laparoscopic cholecystectomy on 1/31/2022.  labs done results pending, ekg done.  Pt scheduled for preop covid testing.  Hibiclens provided-  written and verbal instructions given with teach back, pt able to verbalize understanding.  Preop teaching done, pt able to verbalize understanding.  medications day of procedure-  pepcid, symbicort, albuterol   anesthesia- BRITTANY precautions   substance abuse on zubsolv will f/u   pst request   echo 2016 in chart.  pulmonary note last in chart.

## 2022-01-13 NOTE — H&P PST ADULT - NSICDXPASTSURGICALHX_GEN_ALL_CORE_FT
PAST SURGICAL HISTORY:  H/O chest tube placement     History of lung surgery Bullectomy     PAST SURGICAL HISTORY:  H/O chest tube placement     History of lung surgery bilateral Bullectomy 2015     PAST SURGICAL HISTORY:  History of lung surgery bilateral Bullectomy 2015

## 2022-01-13 NOTE — H&P PST ADULT - NSICDXPASTMEDICALHX_GEN_ALL_CORE_FT
PAST MEDICAL HISTORY:  COPD (chronic obstructive pulmonary disease)     Lung collapse      PAST MEDICAL HISTORY:  Bipolar disorder     Bullous emphysema     Cholecystitis     COPD (chronic obstructive pulmonary disease)     Lung collapse     Substance abuse oxycodone, percocet last used 2012 now on zubsolv     PAST MEDICAL HISTORY:  Bipolar disorder     Bullous emphysema     Cholecystitis     COPD (chronic obstructive pulmonary disease)     Lumbago     Lung collapse     Obese     Substance abuse abused prescribed medications:  oxycodone, percocet last used 2012 now on zubsolv

## 2022-01-13 NOTE — H&P PST ADULT - HISTORY OF PRESENT ILLNESS
45y/o male scheduled for laparoscopic cholecystectomy on 1/31/2022.  Pt states, "approx 8 months ago developed right upper quad pain, US shows gall stones, pain occurs intermittently after eating meals."

## 2022-01-28 NOTE — ASU PATIENT PROFILE, ADULT - FALL HARM RISK - UNIVERSAL INTERVENTIONS
Bed in lowest position, wheels locked, appropriate side rails in place/Call bell, personal items and telephone in reach/Instruct patient to call for assistance before getting out of bed or chair/Non-slip footwear when patient is out of bed/[place] to call system/Physically safe environment - no spills, clutter or unnecessary equipment/Purposeful Proactive Rounding/Room/bathroom lighting operational, light cord in reach

## 2022-01-28 NOTE — ASU PATIENT PROFILE, ADULT - NSICDXPASTMEDICALHX_GEN_ALL_CORE_FT
PAST MEDICAL HISTORY:  Bipolar disorder     Bullous emphysema     Cholecystitis     COPD (chronic obstructive pulmonary disease)     Lumbago     Lung collapse     Obese     Substance abuse abused prescribed medications:  oxycodone, percocet last used 2012 now on zubsolv

## 2022-01-30 ENCOUNTER — TRANSCRIPTION ENCOUNTER (OUTPATIENT)
Age: 47
End: 2022-01-30

## 2022-01-31 ENCOUNTER — OUTPATIENT (OUTPATIENT)
Dept: OUTPATIENT SERVICES | Facility: HOSPITAL | Age: 47
LOS: 1 days | Discharge: HOME | End: 2022-01-31
Payer: COMMERCIAL

## 2022-01-31 ENCOUNTER — RESULT REVIEW (OUTPATIENT)
Age: 47
End: 2022-01-31

## 2022-01-31 VITALS
WEIGHT: 270.07 LBS | DIASTOLIC BLOOD PRESSURE: 81 MMHG | RESPIRATION RATE: 16 BRPM | OXYGEN SATURATION: 96 % | TEMPERATURE: 98 F | HEART RATE: 76 BPM | HEIGHT: 71 IN | SYSTOLIC BLOOD PRESSURE: 132 MMHG

## 2022-01-31 VITALS
SYSTOLIC BLOOD PRESSURE: 124 MMHG | RESPIRATION RATE: 18 BRPM | TEMPERATURE: 98 F | DIASTOLIC BLOOD PRESSURE: 87 MMHG | HEART RATE: 68 BPM | OXYGEN SATURATION: 94 %

## 2022-01-31 DIAGNOSIS — K80.20 CALCULUS OF GALLBLADDER WITHOUT CHOLECYSTITIS WITHOUT OBSTRUCTION: ICD-10-CM

## 2022-01-31 DIAGNOSIS — Z98.890 OTHER SPECIFIED POSTPROCEDURAL STATES: Chronic | ICD-10-CM

## 2022-01-31 PROCEDURE — 88304 TISSUE EXAM BY PATHOLOGIST: CPT | Mod: 26

## 2022-01-31 DEVICE — CLIP APPLIER COVIDIEN ENDOCLIP II 10MM MED/LG: Type: IMPLANTABLE DEVICE | Status: FUNCTIONAL

## 2022-01-31 DEVICE — SURGICEL 4 X 8": Type: IMPLANTABLE DEVICE | Status: FUNCTIONAL

## 2022-01-31 RX ORDER — ALBUTEROL 90 UG/1
2 AEROSOL, METERED ORAL
Qty: 0 | Refills: 0 | DISCHARGE

## 2022-01-31 RX ORDER — BUPRENORPHINE AND NALOXONE 2; .5 MG/1; MG/1
1 TABLET SUBLINGUAL
Qty: 0 | Refills: 0 | DISCHARGE

## 2022-01-31 RX ORDER — MONTELUKAST 4 MG/1
1 TABLET, CHEWABLE ORAL
Qty: 0 | Refills: 0 | DISCHARGE

## 2022-01-31 RX ORDER — OXYCODONE HYDROCHLORIDE 5 MG/1
5 TABLET ORAL ONCE
Refills: 0 | Status: DISCONTINUED | OUTPATIENT
Start: 2022-01-31 | End: 2022-01-31

## 2022-01-31 RX ORDER — ACETAMINOPHEN 500 MG
2 TABLET ORAL
Qty: 24 | Refills: 0
Start: 2022-01-31 | End: 2022-02-02

## 2022-01-31 RX ORDER — BUDESONIDE AND FORMOTEROL FUMARATE DIHYDRATE 160; 4.5 UG/1; UG/1
2 AEROSOL RESPIRATORY (INHALATION)
Qty: 0 | Refills: 0 | DISCHARGE

## 2022-01-31 RX ORDER — FENTANYL CITRATE 50 UG/ML
25 INJECTION INTRAVENOUS
Refills: 0 | Status: DISCONTINUED | OUTPATIENT
Start: 2022-01-31 | End: 2022-01-31

## 2022-01-31 RX ORDER — OXYCODONE HYDROCHLORIDE 5 MG/1
1 TABLET ORAL
Qty: 8 | Refills: 0
Start: 2022-01-31 | End: 2022-02-01

## 2022-01-31 RX ORDER — FLUOXETINE HCL 10 MG
1 CAPSULE ORAL
Qty: 0 | Refills: 0 | DISCHARGE

## 2022-01-31 RX ORDER — DIVALPROEX SODIUM 500 MG/1
2 TABLET, DELAYED RELEASE ORAL
Qty: 0 | Refills: 0 | DISCHARGE

## 2022-01-31 RX ORDER — ONDANSETRON 8 MG/1
4 TABLET, FILM COATED ORAL ONCE
Refills: 0 | Status: DISCONTINUED | OUTPATIENT
Start: 2022-01-31 | End: 2022-02-14

## 2022-01-31 NOTE — ASU DISCHARGE PLAN (ADULT/PEDIATRIC) - FOR NEXT 24 HOURS DO NOT:
reports he went for blood work yesterday and was directed to ER by Dr Vasquez due to low Na+ level (119). Reports feeling "more tired." Denies chest pain/sob/dizziness or other symptoms. ekg in progress.
Statement Selected

## 2022-01-31 NOTE — ASU DISCHARGE PLAN (ADULT/PEDIATRIC) - ASU DC SPECIAL INSTRUCTIONSFT
- Drink plenty of fluids and rest as needed.  - Leave steri strips in place  - Ice for swelling  - No lifting >20 lbs for 2 weeks  - Please call the office at [phone] to schedule a post-op appointment for 7-10 days

## 2022-01-31 NOTE — BRIEF OPERATIVE NOTE - ELECTIVE PROCEDURE
Yes W/ chronic microcytic anemia likely 2/2 ACD vs iron def anemia. Baseline 8-9  -TIBC, Ferritin wnl. Serum iron dec to 16.  -c/w iron suppl  -No active signs of bleeding at this time  -Maintain active type and screen  -Monitor CBCs

## 2022-01-31 NOTE — ASU DISCHARGE PLAN (ADULT/PEDIATRIC) - CARE PROVIDER_API CALL
Chaitanya Iqbal)  ColonRectal Surgery; Surgery  [address], [address]  [place] [address]  Phone: [phone]  Fax: [phone]  Follow Up Time: 2 weeks

## 2022-01-31 NOTE — ASU DISCHARGE PLAN (ADULT/PEDIATRIC) - NS MD DC FALL RISK RISK
For information on Fall & Injury Prevention, visit: [email or web address] OR  [email or web address] OR  https://www.cdc.gov/steadi/patient.html

## 2022-02-08 LAB — SURGICAL PATHOLOGY STUDY: SIGNIFICANT CHANGE UP

## 2022-09-28 ENCOUNTER — NON-APPOINTMENT (OUTPATIENT)
Age: 47
End: 2022-09-28

## 2022-09-29 ENCOUNTER — APPOINTMENT (OUTPATIENT)
Dept: PULMONOLOGY | Facility: CLINIC | Age: 47
End: 2022-09-29

## 2022-09-29 ENCOUNTER — OUTPATIENT (OUTPATIENT)
Dept: OUTPATIENT SERVICES | Facility: HOSPITAL | Age: 47
LOS: 1 days | End: 2022-09-29
Payer: COMMERCIAL

## 2022-09-29 ENCOUNTER — APPOINTMENT (OUTPATIENT)
Dept: RADIOLOGY | Facility: IMAGING CENTER | Age: 47
End: 2022-09-29

## 2022-09-29 VITALS
RESPIRATION RATE: 17 BRPM | WEIGHT: 281.19 LBS | DIASTOLIC BLOOD PRESSURE: 96 MMHG | HEIGHT: 71 IN | TEMPERATURE: 97.5 F | HEART RATE: 86 BPM | SYSTOLIC BLOOD PRESSURE: 135 MMHG | OXYGEN SATURATION: 96 % | BODY MASS INDEX: 39.37 KG/M2

## 2022-09-29 DIAGNOSIS — J44.9 CHRONIC OBSTRUCTIVE PULMONARY DISEASE, UNSPECIFIED: ICD-10-CM

## 2022-09-29 DIAGNOSIS — Z98.890 OTHER SPECIFIED POSTPROCEDURAL STATES: Chronic | ICD-10-CM

## 2022-09-29 PROBLEM — E66.9 OBESITY, UNSPECIFIED: Chronic | Status: ACTIVE | Noted: 2022-01-13

## 2022-09-29 PROBLEM — K81.9 CHOLECYSTITIS, UNSPECIFIED: Chronic | Status: ACTIVE | Noted: 2022-01-13

## 2022-09-29 PROBLEM — M54.50 LOW BACK PAIN, UNSPECIFIED: Chronic | Status: ACTIVE | Noted: 2022-01-13

## 2022-09-29 PROBLEM — F31.9 BIPOLAR DISORDER, UNSPECIFIED: Chronic | Status: ACTIVE | Noted: 2022-01-13

## 2022-09-29 PROBLEM — F19.10 OTHER PSYCHOACTIVE SUBSTANCE ABUSE, UNCOMPLICATED: Chronic | Status: ACTIVE | Noted: 2022-01-13

## 2022-09-29 PROBLEM — J43.9 EMPHYSEMA, UNSPECIFIED: Chronic | Status: ACTIVE | Noted: 2022-01-13

## 2022-09-29 PROCEDURE — 71046 X-RAY EXAM CHEST 2 VIEWS: CPT | Mod: 26

## 2022-09-29 PROCEDURE — 71046 X-RAY EXAM CHEST 2 VIEWS: CPT

## 2022-09-29 PROCEDURE — 99213 OFFICE O/P EST LOW 20 MIN: CPT

## 2022-09-29 NOTE — ASSESSMENT
[FreeTextEntry1] : the patient's resting oxygen saturation was 97% and after walking 300 feet, it remained above 95%. His chest radiograph is unchanged from prior radiographs. He has extensive bullous disease. 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 He will followup as needed.

## 2022-09-29 NOTE — ASSESSMENT
[FreeTextEntry1] : the patient has extensive bullous emphysema but interestingly his lung functions show a significant bronchodilator response. He currently uses his Symbicort twice a day and occasionally his albuterol inhaler. If he is to have gallbladder surgery, I suggested that he continue to use his Symbicort and to use the albuterol inhaler just prior to surgery.\par I also suggested a followup CAT scan at some point.

## 2022-09-29 NOTE — HISTORY OF PRESENT ILLNESS
[TextBox_4] : 47 year old male with extensive bullous emphysema described a new onset cough, sore throat and what he describes as hypoxemia. His wife measured his oxygen saturation was 87%. Today he feels much better. He denies any chest discomfort, coughing but continues to have dyspnea on exertion as previously.

## 2022-09-29 NOTE — HISTORY OF PRESENT ILLNESS
[TextBox_4] : 46 year old male with pmh former smoker with bullous emphysema s/p B/L bullectomy in 2015 presents for follow up. He does notice occasional pain to his lower back and is not sure if his lungs are the cause of the pain.  \par He has been taking Symbicort and Ventolin as needed and denies having any significant respiratory issues prior to this episode. His last CT scan is from 2019 which showed emphysema.  He currently denies any dyspnea, cough, chest tightness or wheezing.

## 2022-09-29 NOTE — END OF VISIT
[FreeTextEntry3] : I obtained the history and examined the patient and developed a plan of care  Angel Claire MD\par

## 2022-09-29 NOTE — PHYSICAL EXAM
[No Acute Distress] : no acute distress [Normal Appearance] : normal appearance [Normal Rate/Rhythm] : normal rate/rhythm [Normal S1, S2] : normal s1, s2 [No Resp Distress] : no resp distress [No Focal Deficits] : no focal deficits [TextBox_68] : decreased breath sounds right upper lobe [TextBox_125] : extensive tattooing

## 2022-10-18 NOTE — HISTORY OF PRESENT ILLNESS
[Heartburn] : denies heartburn [Nausea] : stable nausea [Vomiting] : stable vomiting [Diarrhea] : denies diarrhea [Constipation] : constipation worsened [Yellow Skin Or Eyes (Jaundice)] : denies jaundice [Abdominal Pain] : abdominal pain worsened [Abdominal Swelling] : denies abdominal swelling [Rectal Pain] : denies rectal pain [Cholelithiasis] : cholelithiasis [GERD] : no gastroesophageal reflux disease [Hiatus Hernia] : no hiatus hernia [Peptic Ulcer Disease] : no peptic ulcer disease [Pancreatitis] : no pancreatitis [Kidney Stone] : no kidney stone [Inflammatory Bowel Disease] : no inflammatory bowel disease [Irritable Bowel Syndrome] : no irritable bowel syndrome [Diverticulitis] : no diverticulitis [Alcohol Abuse] : no alcohol abuse [Malignancy] : no malignancy [Abdominal Surgery] : no abdominal surgery [Appendectomy] : no appendectomy [Cholecystectomy] : no cholecystectomy [de-identified] : 47 y/o male with a Hx of Bipolar presented to the ER on 7/22/21\par c/o 5 days of left flank pain, hematuria cola colored urine and dysuria. Pt states he saw his PMD\par  and was told to go to the ER for evaluation. Pt denies fevers,\par chills, nausea, vomiting, diarrhea, chest pain, sob, penile pain, testicular\par pain. Pt states hematuria has resolved. Patient states pain started  in upper abdomen that radiated to back. On July 17 he noticed Constipation became progressively worse he was prescribed Linzess 145 mcg daily with minimal relief. Patient has had 2 positive Cologuard last test 1 week ago. He has a significant family history Colon Cancer father and paternal grandfather both DX with colon cancer. Denies rectal  bleeding, blood in the stool, melena, hematemesis. He Denies ETOH use. Nausea and Vomiting occurs frequently last episode today. Denies fever or chills. Urine Specific Gravity 1034, LFTs AST 59, . Bilirubin norm, no blood in urine. Total Dose (Optional-Please Include Units): 5362.80 cGy

## 2023-03-22 NOTE — H&P ADULT - NSHPPOAPULMEMBOLUS_GEN_A_CORE
Abdomen,  soft, nontender, nondistended,  no guarding or rigidity,  no masses palpable,  normal bowel sounds Liver and Spleen,no hepatosplenomegaly
ED PROVIDER NOTE   Date of Service: 2023   Time Seen: 3:38 PM   Provider: Deep Suarez NP  Supervising Physician: Dr Agudelo    CHIEF COMPLAINT   No chief complaint on file.       HISTORY OF PRESENT ILLNESS     Old records reviewed :  Past medical history significant for SVT, breast cancer.    History obtained from patient and medical record.      History limited by:  Nothing     This patient is a 61 year old female presenting to the emergency department with a chief complaint of coarsening lower abdominal pain.  Patient presents to the emergency department for further workup 3 days lower abdominal pain localizing to the left lower quadrant.  She has noted diarrhea intermittently over the past few days.  She denies presence of blood or mucus in stool.  She denies history of diverticulitis however her brother and mother of at similar symptoms with a similar presentation.  She denies chest pain or shortness of breath.  No urinary complaints.  She has had a  in the past otherwise no previous abdominal surgeries.  No associated fevers or chills.  Pain remained despite use of home acetaminophen.     PAST MEDICAL HISTORY     Past Medical History:   Diagnosis Date   • History of bilateral breast cancer    • Personal history of radiation therapy     33 treatment   • SVT (supraventricular tachycardia) (CMS/HCC)       PAST FAMILY HISTORY   Family History   Problem Relation Age of Onset   • Cancer, Kidney Mother    • COPD Mother    • Heart disease Father    • Asthma Brother    • Cancer, Breast Paternal Grandmother 70   • Cancer, Ovarian Maternal Aunt 60      PAST SURGICAL HISTORY   Past Surgical History:   Procedure Laterality Date   • Ablation av node - cv     • Breast lumpectomy Bilateral     & invasive in ductal, left / breast stoma, left   •  delivery only     • Cholecystectomy     • Cyst removal      on coccyx   • Knee surgery      bone chips irrigated   • Removal of 
coccyx  1976   • Sinus surgery  2010    reconstruction of nasal passages and sinus   • Tonsillectomy  1973   • Tubal ligation  1990    clamps on tubes      SOCIAL HISTORY   Social History     Socioeconomic History   • Marital status: /Civil Union     Spouse name: Not on file   • Number of children: Not on file   • Years of education: Not on file   • Highest education level: Not on file   Occupational History   • Not on file   Tobacco Use   • Smoking status: Never   • Smokeless tobacco: Never   Vaping Use   • Vaping Use: never used   Substance and Sexual Activity   • Alcohol use: Not Currently   • Drug use: Not Currently   • Sexual activity: Not on file   Other Topics Concern   • Not on file   Social History Narrative   • Not on file     Social Determinants of Health     Financial Resource Strain: Not on file   Food Insecurity: Not on file   Transportation Needs: Not on file   Physical Activity: Not on file   Stress: Not on file   Social Connections: Not on file   Intimate Partner Violence: Not on file           MEDICATIONS     Discharge Medication List as of 2/25/2023 12:03 PM      CONTINUE these medications which have NOT CHANGED    Details   metoPROLOL succinate (TOPROL-XL) 25 MG 24 hr tablet Take 1 tablet by mouth once dailyEprescribe, Disp-90 tablet, R-3      MULTIPLE VITAMINS PO Fish oil 28926 mg  Bromelain/ Boswellia complex 2 caps BID  Calcium 1200 mg  Tylenol muscle, arthritis or PM PRNHistorical Med              ALLERGIES   ALLERGIES:   Allergen Reactions   • Ampicillin-Sulbactam Sodium Other (See Comments)     Unasyn / BP dropped   • Aspirin Other (See Comments)     BP dropped / chest pain   • Codeine Other (See Comments)     BP dropped   • Erythromycin Other (See Comments)     Lethargic, bp drop   • Levaquin Other (See Comments)   • Nsaids Other (See Comments)        REVIEW OF SYSTEMS   Constitutional: Negative for activity change, appetite change, chills, fatigue and fever.   HENT: Negative for 
congestion, rhinorrhea, sinus pressure and sore throat.    Eyes: Negative for photophobia and visual disturbance.   Respiratory: Negative for chest tightness and shortness of breath.    Cardiovascular: Negative for chest pain and palpitations.   Gastrointestinal:  Positive for abdominal pain and diarrhea.  Negative for nausea or vomiting.   Genitourinary: Negative for decreased urine volume, difficulty urinating, dysuria, flank pain and urgency.   Musculoskeletal: Negative for arthralgias, back pain, myalgias, neck pain and neck stiffness.   Skin: Negative for color change, pallor, rash and wound.   Neurological: Negative for dizziness, syncope, weakness, light-headedness, numbness and headaches.   Hematological: Negative for adenopathy. Does not bruise/bleed easily.   Psychiatric/Behavioral: Negative for confusion. The patient is not nervous/anxious.      PHYSICAL EXAM   Triage Vitals:   ED Triage Vitals [02/25/23 1014]   ED Triage Vitals Group      Temp 98.6 °F (37 °C)      Heart Rate 91      Resp 16      /66      SpO2 95 %      EtCO2 mmHg       Height 5' (1.524 m)      Weight 117 lb (53.1 kg)      Weight Scale Used ED Stated      BMI (Calculated) 22.85      IBW/kg (Calculated) 45.5          Constitutional: This patient is a well developed, and well nourished, very pleasant 61 year old female who is sitting in bed in no acute distress. The patient does not appear to be in pain or discomfort at this time.   Eyes: Pupils are equal, round, and reactive to light. Extraocular movements are intact. Conjunctiva pink   ENT: Oropharynx is clear. There are moist mucous membranes.   Neck: Supple. No lymphadenopathy.Trachea midline   Respiratory: The exam is clear to auscultation bilaterally with normal effort. There are no wheezes, rales, or rhonchi.   Cardiovascular: The patient has a regular rate and rhythm. There are no obvious murmurs, rubs, or gallops.2+ dorsal pedal pulses bilaterally.   Gastrointestinal:  
Diffuse lower abdominal tenderness on palpation with guarding.  No rebound tenderness.  Suprapubic and left lower quadrant palpation is exquisite.  No distention.  Normoactive bowel sounds.   Musculoskeletal: There is no clubbing, cyanosis or edema. The patient has a full range of motion in all extremities without pain.   Skin: Warm and dry without rashes.   Neurologic: Alert and oriented x3. The patient has a GCS of 15.Cranial nerves II through XII intact. Sensation to touch grossly intact.     DIAGNOSTIC INVESTIGATIONS   Radiology Studies:   Radiology reports reviewed.   CT ABDOMEN PELVIS W CONTRAST   Final Result   IMPRESSION:    1. Abnormal thickening and inflammation involving a long segment of sigmoid   colon. Differential diagnosis includes acute diverticulitis versus colonic   neoplasm or inflammatory bowel disease. Colonoscopy recommended.   2. Hepatic steatosis.                 Laboratory Studies:   Labs were ordered and reviewed.   Labs Reviewed   COMPREHENSIVE METABOLIC PANEL - Abnormal; Notable for the following components:       Result Value    Glucose 127 (*)     Bilirubin, Total 1.1 (*)     All other components within normal limits   URINALYSIS & REFLEX MICROSCOPY WITH CULTURE IF INDICATED - Abnormal; Notable for the following components:    OCCULT BLOOD, URINALYSIS Moderate (*)     ERYTHROCYTES, URINALYSIS 3 to 5 (*)     BACTERIA, URINALYSIS Few (*)     All other components within normal limits   CBC WITH AUTOMATED DIFFERENTIAL (PERFORMABLE ONLY) - Abnormal; Notable for the following components:    WBC 11.6 (*)     Absolute Neutrophils 9.0 (*)     Absolute Monocytes 1.1 (*)     All other components within normal limits    Narrative:     This is an appended report.  These results have been appended to a previously verified report.   LIPASE - Normal   CBC WITH DIFFERENTIAL    Narrative:     The following orders were created for panel order CBC with Automated Differential.  Procedure                    
           Abnormality         Status                     ---------                               -----------         ------                     CBC with Automated Dif...[[id number]]  Abnormal            Final result                 Please view results for these tests on the individual orders.   RAINBOW DRAW    Narrative:     The following orders were created for panel order [place] Draw Light Blue Top Collected? 1 Label; Light Green Top Collected? 1 Label; Lavender Top Collected? 1 Label; Gold Top Collected? 1 Label; Grey Top Collected? 1 Label.  Procedure                               Abnormality         Status                     ---------                               -----------         ------                     Light Blue Top[[id number]]                                 In process                 Light Green Top[[id number]]                                In process                 Lavender Top[[id number]]                                   In process                 Gold Top[[id number]]                                       In process                 [place] Top Tube[[id number]]                                  In process                   Please view results for these tests on the individual orders.   LIGHT BLUE TOP   LIGHT GREEN TOP   LAVENDER TOP   GOLD TOP   GREY TOP TUBE        Monitor and Pulse Ox:   Pulse oximetry is 95 % on room air , which is interpreted as normal by me.     Patient was placed on cardiac monitor with normal sinus rhythm at a rate of 91 bpm, no ectopy, interpreted by me.     ASSESSMENT AND PLAN     Vitals:    02/25/23 1200   BP: 104/73   Pulse: 91   Resp:    Temp:     [reviewed]     ED Course:    1200 - patient and significant other at bedside updated that white blood cell count did show mild elevation.  Remaining blood tests are unremarkable.  Vital signs are stable, no obvious signs of sepsis appreciated.  CT scan of abdomen/pelvis raises concerns for diverticulitis versus colonic 
neoplasm.  The latter is thought to be less likely as the patient's presentation is concerning for diverticulitis with worsening pain over the past 2 days with leukocytosis and diffuse lower abdominal pain.  Patient reports previous colonoscopy about 4-5 years ago.  She is hopeful to follow up locally with the GI specialist and referral will be placed.  Nevertheless, will initiate antibiotics and offer pain control and treat these symptoms at home as no findings concerning for abscess or perforation are noted.  I did review the patient's allergies with her.  She verbalized that she has tolerated Augmentin in the past.  Will initiate this along with tramadol for symptom control.  She is agreeable with plan.  If worsening symptoms of pain, fever or bloody diarrhea are noted, return for re-evaluation.        Interventions:   Medications   sodium chloride 0.9 % injector flush 100 mL (100 mLs Injection Given 2/25/23 1100)   sodium chloride (PF) 0.9 % injection 10 mL (10 mLs Injection Given 2/25/23 1100)   iohexol (OMNIPAQUE 300) contrast solution 100 mL (100 mLs Intravenous Given 2/25/23 1100)        Repeat Vitals:   Vitals:    02/25/23 1014 02/25/23 1200   BP: 123/66 104/73   Patient Position: Sitting/High-[place]'s    Pulse: 91 91   Resp: 16    Temp: 98.6 °F (37 °C)    SpO2: 95% 97%   Weight: 53.1 kg (117 lb)    Height: 5' (1.524 m)         Procedures      CLINICAL IMPRESSION   1. Diverticulitis         DISPOSITION   Dc to home     TAMRA Funez  02/25/23 1058    
no

## 2023-04-03 NOTE — REASON FOR VISIT
Discontinue Regimen: 5fu Plan: RTC if AKs return Detail Level: Detailed Continue Regimen: Strict sun protection [Acute] : an acute visit [Shortness of Breath] : shortness of breath

## 2023-06-29 NOTE — H&P PST ADULT - PROBLEM/PLAN-1
DISPLAY PLAN FREE TEXT Bactrim Pregnancy And Lactation Text: This medication is Pregnancy Category D and is known to cause fetal risk.  It is also excreted in breast milk.

## 2023-10-27 ENCOUNTER — APPOINTMENT (OUTPATIENT)
Dept: PULMONOLOGY | Facility: CLINIC | Age: 48
End: 2023-10-27
Payer: COMMERCIAL

## 2023-10-27 VITALS
RESPIRATION RATE: 15 BRPM | SYSTOLIC BLOOD PRESSURE: 104 MMHG | OXYGEN SATURATION: 90 % | HEART RATE: 72 BPM | TEMPERATURE: 97.8 F | DIASTOLIC BLOOD PRESSURE: 67 MMHG | HEIGHT: 71 IN | BODY MASS INDEX: 37.1 KG/M2 | WEIGHT: 265 LBS

## 2023-10-27 DIAGNOSIS — J43.9 EMPHYSEMA, UNSPECIFIED: ICD-10-CM

## 2023-10-27 PROCEDURE — 99213 OFFICE O/P EST LOW 20 MIN: CPT

## 2023-12-11 NOTE — DISCHARGE NOTE NURSING/CASE MANAGEMENT/SOCIAL WORK - NSDPLANG ASIS_GEN_ALL_CORE
[facility] Call Center    Phone Message    May a detailed message be left on voicemail: yes     Reason for Call: Other: Help is needed to determine the diagnosis and scheduling this patient. She was in the ED multiple times in 9-2023 and they suggested that she see an ENT MD. Her chart was reviewed. There are no specific referrals, or directions to come to ent. The pt states her diagnosis' are: R Mastoid Effusion, connection of 2 parts in her r inner ear(she didn't have more info of that), enhancement of major dural venous sinus, chronic L nasal and throat redness. Could you review to see who the best MD is for this?  The pt has seen Dr. Hough in the past. Should this go to him directly? Please advise and contact the pt. Thanks.    Action Taken: Message routed to:  Other:  ENT    Travel Screening: Not Applicable                                                                     No

## 2024-09-14 NOTE — ASU PATIENT PROFILE, ADULT - NSTOBACCO TYPE_GEN_A_CORE_RD
[Well Nourished] : well nourished [No Accessory Muscle Use] : no accessory muscle use [Clear to Auscultation] : lungs were clear to auscultation bilaterally [Regular Rhythm] : with a regular rhythm [Normal S1, S2] : normal S1 and S2 [No Murmur] : no murmur heard [Soft] : abdomen soft [Non Tender] : non-tender [Non-distended] : non-distended [No HSM] : no HSM [Normal Bowel Sounds] : normal bowel sounds [Normal Affect] : the affect was normal Cigarettes [Normal Insight/Judgement] : insight and judgment were intact [de-identified] : 1+ b/l LE edema

## 2024-10-15 ENCOUNTER — APPOINTMENT (OUTPATIENT)
Dept: UROLOGY | Facility: CLINIC | Age: 49
End: 2024-10-15
Payer: COMMERCIAL

## 2024-10-15 ENCOUNTER — APPOINTMENT (OUTPATIENT)
Dept: PULMONOLOGY | Facility: CLINIC | Age: 49
End: 2024-10-15
Payer: COMMERCIAL

## 2024-10-15 ENCOUNTER — NON-APPOINTMENT (OUTPATIENT)
Age: 49
End: 2024-10-15

## 2024-10-15 VITALS
RESPIRATION RATE: 17 BRPM | HEIGHT: 71 IN | WEIGHT: 283 LBS | HEART RATE: 92 BPM | BODY MASS INDEX: 39.62 KG/M2 | SYSTOLIC BLOOD PRESSURE: 134 MMHG | DIASTOLIC BLOOD PRESSURE: 83 MMHG

## 2024-10-15 VITALS
BODY MASS INDEX: 39.62 KG/M2 | DIASTOLIC BLOOD PRESSURE: 78 MMHG | HEIGHT: 71 IN | HEART RATE: 85 BPM | SYSTOLIC BLOOD PRESSURE: 125 MMHG | WEIGHT: 283 LBS | OXYGEN SATURATION: 96 %

## 2024-10-15 DIAGNOSIS — R97.20 ELEVATED PROSTATE, SPECIFIC ANTIGEN [PSA]: ICD-10-CM

## 2024-10-15 DIAGNOSIS — J44.9 CHRONIC OBSTRUCTIVE PULMONARY DISEASE, UNSPECIFIED: ICD-10-CM

## 2024-10-15 DIAGNOSIS — G47.33 OBSTRUCTIVE SLEEP APNEA (ADULT) (PEDIATRIC): ICD-10-CM

## 2024-10-15 PROCEDURE — 99203 OFFICE O/P NEW LOW 30 MIN: CPT

## 2024-10-15 PROCEDURE — 99214 OFFICE O/P EST MOD 30 MIN: CPT

## 2024-10-15 RX ORDER — FLUOXETINE HYDROCHLORIDE 40 MG/1
CAPSULE ORAL
Refills: 0 | Status: ACTIVE | COMMUNITY

## 2024-10-16 ENCOUNTER — TRANSCRIPTION ENCOUNTER (OUTPATIENT)
Age: 49
End: 2024-10-16

## 2024-10-16 LAB
PSA FREE FLD-MCNC: 55 %
PSA FREE SERPL-MCNC: 0.2 NG/ML
PSA SERPL-MCNC: 0.37 NG/ML

## 2024-11-09 ENCOUNTER — APPOINTMENT (OUTPATIENT)
Dept: MRI IMAGING | Facility: IMAGING CENTER | Age: 49
End: 2024-11-09

## 2024-12-05 NOTE — ASU PATIENT PROFILE, ADULT - NS PRO ABUSE SCREEN SUSPICION NEGLECT YN
Last visit: 9/6/24  Last Med refill: 9/6/24  Does patient have enough medication for 72 hours: No:     Next Visit Date:  Future Appointments   Date Time Provider Department Center   1/3/2025  9:00 AM Ector Martini, APRN - CNP [facility]       Health Maintenance   Topic Date Due    HIV screen  Never done    Hepatitis C screen  Never done    Hepatitis B vaccine (1 of 3 - 19+ 3-dose series) Never done    Shingles vaccine (1 of 2) Never done    COVID-19 Vaccine (4 - 2023-24 season) 09/01/2024    Flu vaccine (1) 09/06/2025 (Originally 8/1/2024)    Depression Monitoring  02/26/2025    Breast cancer screen  09/26/2025    Cervical cancer screen  05/04/2026    Lipids  07/21/2028    Colorectal Cancer Screen  08/10/2031    DTaP/Tdap/Td vaccine (2 - Td or Tdap) 11/28/2032    Hepatitis A vaccine  Aged Out    Hib vaccine  Aged Out    Polio vaccine  Aged Out    Meningococcal (ACWY) vaccine  Aged Out    Pneumococcal 0-64 years Vaccine  Aged Out    Diabetes screen  Discontinued       Hemoglobin A1C (%)   Date Value   11/28/2022 5.4   05/22/2015 5.0             ( goal A1C is < 7)   No components found for: \"LABMICR\"  No components found for: \"LDLCHOLESTEROL\", \"LDLCALC\"    (goal LDL is <100)   AST (U/L)   Date Value   09/04/2024 23     ALT (U/L)   Date Value   09/04/2024 13     BUN (mg/dL)   Date Value   05/20/2024 14     BP Readings from Last 3 Encounters:   09/06/24 124/80   04/09/24 120/70   03/27/24 135/82          (goal 120/80)    All Future Testing planned in [facility]  Lab Frequency Next Occurrence   Hepatic Function Panel Once 12/06/2024   Hepatic Function Panel Once 03/06/2025               Patient Active Problem List:     Anxiety     Depression     GERD (gastroesophageal reflux disease)     Hyperlipidemia     Anemia     Endometriosis     ASCUS (atypical squamous cells of undetermined significance) on Pap smear     Neoplasm of uncertain behavior of skin     Essential hypertension     Encounter for 
no

## 2025-01-17 ENCOUNTER — APPOINTMENT (OUTPATIENT)
Dept: PULMONOLOGY | Facility: CLINIC | Age: 50
End: 2025-01-17
Payer: COMMERCIAL

## 2025-01-17 VITALS
RESPIRATION RATE: 15 BRPM | HEART RATE: 104 BPM | SYSTOLIC BLOOD PRESSURE: 112 MMHG | DIASTOLIC BLOOD PRESSURE: 72 MMHG | OXYGEN SATURATION: 92 % | TEMPERATURE: 97 F | HEIGHT: 71 IN

## 2025-01-17 DIAGNOSIS — J44.9 CHRONIC OBSTRUCTIVE PULMONARY DISEASE, UNSPECIFIED: ICD-10-CM

## 2025-01-17 DIAGNOSIS — J43.9 EMPHYSEMA, UNSPECIFIED: ICD-10-CM

## 2025-01-17 PROCEDURE — 99213 OFFICE O/P EST LOW 20 MIN: CPT

## 2025-02-08 ENCOUNTER — APPOINTMENT (OUTPATIENT)
Dept: CT IMAGING | Facility: IMAGING CENTER | Age: 50
End: 2025-02-08

## 2025-02-08 ENCOUNTER — OUTPATIENT (OUTPATIENT)
Dept: OUTPATIENT SERVICES | Facility: HOSPITAL | Age: 50
LOS: 1 days | End: 2025-02-08

## 2025-02-08 ENCOUNTER — OUTPATIENT (OUTPATIENT)
Dept: OUTPATIENT SERVICES | Facility: HOSPITAL | Age: 50
LOS: 1 days | End: 2025-02-08
Payer: COMMERCIAL

## 2025-02-08 DIAGNOSIS — Z98.890 OTHER SPECIFIED POSTPROCEDURAL STATES: Chronic | ICD-10-CM

## 2025-02-08 DIAGNOSIS — Z00.8 ENCOUNTER FOR OTHER GENERAL EXAMINATION: ICD-10-CM

## 2025-02-08 DIAGNOSIS — J44.9 CHRONIC OBSTRUCTIVE PULMONARY DISEASE, UNSPECIFIED: ICD-10-CM

## 2025-02-08 PROCEDURE — 71250 CT THORAX DX C-: CPT

## 2025-02-08 PROCEDURE — 71250 CT THORAX DX C-: CPT | Mod: 26

## 2025-04-02 ENCOUNTER — APPOINTMENT (OUTPATIENT)
Dept: PULMONOLOGY | Facility: CLINIC | Age: 50
End: 2025-04-02

## 2025-04-03 NOTE — ASU PATIENT PROFILE, ADULT - NSSTREETDRUGQ_GEN_ALL_CORE_SD
resolved  Secondary to respiratory failure which is multifactorial related to COPD and CHF.  Probable hypoxia and hypercapnia component as well  Status appears to be improving with nocturnal BIPAP  Continue supportive measures and monitor   sbudov/none

## 2025-05-20 ENCOUNTER — APPOINTMENT (OUTPATIENT)
Dept: GASTROENTEROLOGY | Facility: CLINIC | Age: 50
End: 2025-05-20
Payer: COMMERCIAL

## 2025-05-20 VITALS
TEMPERATURE: 98 F | HEART RATE: 77 BPM | DIASTOLIC BLOOD PRESSURE: 69 MMHG | OXYGEN SATURATION: 96 % | HEIGHT: 71 IN | BODY MASS INDEX: 33.46 KG/M2 | WEIGHT: 239 LBS | SYSTOLIC BLOOD PRESSURE: 106 MMHG

## 2025-05-20 DIAGNOSIS — R14.0 ABDOMINAL DISTENSION (GASEOUS): ICD-10-CM

## 2025-05-20 DIAGNOSIS — J44.9 CHRONIC OBSTRUCTIVE PULMONARY DISEASE, UNSPECIFIED: ICD-10-CM

## 2025-05-20 DIAGNOSIS — K59.00 CONSTIPATION, UNSPECIFIED: ICD-10-CM

## 2025-05-20 DIAGNOSIS — J43.9 EMPHYSEMA, UNSPECIFIED: ICD-10-CM

## 2025-05-20 DIAGNOSIS — D12.6 BENIGN NEOPLASM OF COLON, UNSPECIFIED: ICD-10-CM

## 2025-05-20 PROCEDURE — 99204 OFFICE O/P NEW MOD 45 MIN: CPT

## 2025-05-20 PROCEDURE — G2211 COMPLEX E/M VISIT ADD ON: CPT | Mod: NC

## 2025-05-20 RX ORDER — SEMAGLUTIDE 2.68 MG/ML
8 INJECTION, SOLUTION SUBCUTANEOUS
Refills: 0 | Status: ACTIVE | COMMUNITY

## 2025-05-20 RX ORDER — DOCUSATE SODIUM 100 MG/1
100 CAPSULE ORAL
Qty: 30 | Refills: 3 | Status: ACTIVE | OUTPATIENT
Start: 2025-05-20

## 2025-05-20 RX ORDER — OMEPRAZOLE 40 MG/1
40 CAPSULE, DELAYED RELEASE ORAL
Qty: 30 | Refills: 3 | Status: ACTIVE | COMMUNITY
Start: 2025-05-20 | End: 1900-01-01

## 2025-05-20 RX ORDER — WHEAT DEXTRIN 3 G/4 G
POWDER (GRAM) ORAL
Qty: 1 | Refills: 0 | Status: ACTIVE | OUTPATIENT
Start: 2025-05-20

## 2025-05-20 RX ORDER — SODIUM PICOSULFATE, MAGNESIUM OXIDE, AND ANHYDROUS CITRIC ACID 12; 3.5; 1 G/175ML; G/175ML; MG/175ML
10-3.5-12 MG-GM LIQUID ORAL
Qty: 1 | Refills: 0 | Status: ACTIVE | COMMUNITY
Start: 2025-05-20 | End: 1900-01-01

## 2025-05-20 RX ORDER — LACTOBACILLUS RHAMNOSUS GG 10B CELL
CAPSULE ORAL
Qty: 30 | Refills: 0 | Status: ACTIVE | OUTPATIENT
Start: 2025-05-20

## 2025-07-16 ENCOUNTER — APPOINTMENT (OUTPATIENT)
Dept: CT IMAGING | Facility: HOSPITAL | Age: 50
End: 2025-07-16

## 2025-07-16 ENCOUNTER — OUTPATIENT (OUTPATIENT)
Dept: OUTPATIENT SERVICES | Facility: HOSPITAL | Age: 50
LOS: 1 days | End: 2025-07-16
Payer: COMMERCIAL

## 2025-07-16 DIAGNOSIS — Z98.890 OTHER SPECIFIED POSTPROCEDURAL STATES: Chronic | ICD-10-CM

## 2025-07-16 PROCEDURE — 74177 CT ABD & PELVIS W/CONTRAST: CPT | Mod: 26

## 2025-08-19 ENCOUNTER — APPOINTMENT (OUTPATIENT)
Dept: GASTROENTEROLOGY | Facility: HOSPITAL | Age: 50
End: 2025-08-19
Payer: COMMERCIAL

## 2025-08-19 ENCOUNTER — RESULT REVIEW (OUTPATIENT)
Age: 50
End: 2025-08-19

## 2025-08-19 ENCOUNTER — OUTPATIENT (OUTPATIENT)
Dept: OUTPATIENT SERVICES | Facility: HOSPITAL | Age: 50
LOS: 1 days | End: 2025-08-19
Payer: COMMERCIAL

## 2025-08-19 VITALS
SYSTOLIC BLOOD PRESSURE: 102 MMHG | TEMPERATURE: 97 F | OXYGEN SATURATION: 97 % | RESPIRATION RATE: 16 BRPM | HEART RATE: 84 BPM | HEIGHT: 71 IN | WEIGHT: 229.94 LBS | DIASTOLIC BLOOD PRESSURE: 77 MMHG

## 2025-08-19 VITALS
HEART RATE: 85 BPM | RESPIRATION RATE: 14 BRPM | SYSTOLIC BLOOD PRESSURE: 104 MMHG | OXYGEN SATURATION: 95 % | DIASTOLIC BLOOD PRESSURE: 67 MMHG

## 2025-08-19 DIAGNOSIS — K21.9 GASTRO-ESOPHAGEAL REFLUX DISEASE WITHOUT ESOPHAGITIS: ICD-10-CM

## 2025-08-19 DIAGNOSIS — Z98.890 OTHER SPECIFIED POSTPROCEDURAL STATES: Chronic | ICD-10-CM

## 2025-08-19 PROCEDURE — 88305 TISSUE EXAM BY PATHOLOGIST: CPT | Mod: 26

## 2025-08-19 PROCEDURE — 45378 DIAGNOSTIC COLONOSCOPY: CPT | Mod: PT

## 2025-08-19 PROCEDURE — 43239 EGD BIOPSY SINGLE/MULTIPLE: CPT | Mod: 59

## 2025-08-19 RX ORDER — SODIUM CHLORIDE 9 G/1000ML
500 INJECTION, SOLUTION INTRAVENOUS
Refills: 0 | Status: DISCONTINUED | OUTPATIENT
Start: 2025-08-19 | End: 2025-09-02

## 2025-08-22 LAB — SURGICAL PATHOLOGY STUDY: SIGNIFICANT CHANGE UP

## (undated) DEVICE — SALIVA EJECTOR (BLUE)

## (undated) DEVICE — SUT MONOCRYL 4-0 27" PS-2 UNDYED

## (undated) DEVICE — DRAPE TOWEL 1010

## (undated) DEVICE — GOWN LG

## (undated) DEVICE — ELCTR GROUNDING PAD ADULT COVIDIEN

## (undated) DEVICE — DRSG BENZOIN 0.6CC

## (undated) DEVICE — SOL IRR POUR H2O 500ML

## (undated) DEVICE — BAG SPECIMEN RETRIEVAL ENDOBAG 3X6"

## (undated) DEVICE — CATH ANGIO 14G X 3.25"

## (undated) DEVICE — BLADE SURGICAL #15 CARBON

## (undated) DEVICE — PACK IV START WITH CHG

## (undated) DEVICE — SUT VICRYL 0 27" UR-6

## (undated) DEVICE — PACK GENERAL LAPAROSCOPY

## (undated) DEVICE — TIP METZENBAUM SCISSOR MONOPOLAR ENDOCUT (ORANGE)

## (undated) DEVICE — SOL IRR BAG NS 0.9% 3000ML

## (undated) DEVICE — TROCAR COVIDIEN VERSAONE BLUNT TIP HASSAN 12MM

## (undated) DEVICE — DRSG STERISTRIPS 0.5 X 4"

## (undated) DEVICE — ELCTR ECG CONDUCTIVE ADHESIVE

## (undated) DEVICE — VENODYNE/SCD SLEEVE CALF MEDIUM

## (undated) DEVICE — DRAPE COVER SNAP 36X30"

## (undated) DEVICE — CATH IV SAFE BC 22G X 1" (BLUE)

## (undated) DEVICE — TUBING MEDI-VAC W MAXIGRIP CONNECTORS 1/4"X6'

## (undated) DEVICE — TUBING SUCTION NONCONDUCTIVE 6MM X 12FT

## (undated) DEVICE — CONTAINER FORMALIN 80ML YELLOW

## (undated) DEVICE — LUBRICATING JELLY HR ONE SHOT 3G

## (undated) DEVICE — TUBING INSUFFLATION LAP FILTER 10FT

## (undated) DEVICE — TROCAR COVIDIEN VERSAONE BLADED FIXATION 11MM STANDARD

## (undated) DEVICE — DRSG BANDAID 0.75X3"

## (undated) DEVICE — BASIN SET SINGLE

## (undated) DEVICE — TUBING HYDRO-SURG PLUS IRRIGATOR W SMOKEVAC & PROBE

## (undated) DEVICE — TUBING IV SET GRAVITY 3Y 100" MACRO

## (undated) DEVICE — SYR LUER LOK 20CC

## (undated) DEVICE — WARMING BLANKET UPPER ADULT

## (undated) DEVICE — DRSG 2X2

## (undated) DEVICE — POSITIONER STRAP ARMBOARD VELCRO TS-30

## (undated) DEVICE — BIOPSY FORCEP COLD DISP

## (undated) DEVICE — BIOPSY FORCEP RADIAL JAW 4 STANDARD WITH NEEDLE

## (undated) DEVICE — GLV 7.5 PROTEXIS (CREAM) MICRO

## (undated) DEVICE — TUBING OLYMPUS INSUFFLATION

## (undated) DEVICE — DISSECTOR ENDOSCOPIC KITTNER SINGLE TIP

## (undated) DEVICE — TROCAR COVIDIEN VERSAPORT BLADELESS OPTICAL 5MM STANDARD

## (undated) DEVICE — BASIN EMESIS 10IN GRADUATED MAUVE

## (undated) DEVICE — DRSG CURITY GAUZE SPONGE 4 X 4" 12-PLY NON-STERILE

## (undated) DEVICE — DRAPE 3/4 SHEET 52X76"

## (undated) DEVICE — DRAPE TOWEL BLUE 17" X 24"